# Patient Record
Sex: FEMALE | Race: WHITE | NOT HISPANIC OR LATINO
[De-identification: names, ages, dates, MRNs, and addresses within clinical notes are randomized per-mention and may not be internally consistent; named-entity substitution may affect disease eponyms.]

---

## 2017-09-08 ENCOUNTER — APPOINTMENT (OUTPATIENT)
Dept: GASTROENTEROLOGY | Facility: CLINIC | Age: 62
End: 2017-09-08
Payer: COMMERCIAL

## 2017-09-08 VITALS
TEMPERATURE: 98.3 F | HEIGHT: 67 IN | BODY MASS INDEX: 20.88 KG/M2 | DIASTOLIC BLOOD PRESSURE: 70 MMHG | SYSTOLIC BLOOD PRESSURE: 102 MMHG | WEIGHT: 133 LBS

## 2017-09-08 DIAGNOSIS — Z80.9 FAMILY HISTORY OF MALIGNANT NEOPLASM, UNSPECIFIED: ICD-10-CM

## 2017-09-08 DIAGNOSIS — Z78.9 OTHER SPECIFIED HEALTH STATUS: ICD-10-CM

## 2017-09-08 PROCEDURE — 99213 OFFICE O/P EST LOW 20 MIN: CPT

## 2017-09-19 ENCOUNTER — APPOINTMENT (OUTPATIENT)
Dept: GASTROENTEROLOGY | Facility: AMBULATORY MEDICAL SERVICES | Age: 62
End: 2017-09-19
Payer: COMMERCIAL

## 2017-09-19 PROCEDURE — 45381 COLONOSCOPY SUBMUCOUS NJX: CPT

## 2018-06-19 ENCOUNTER — RESULT REVIEW (OUTPATIENT)
Age: 63
End: 2018-06-19

## 2020-01-28 ENCOUNTER — APPOINTMENT (OUTPATIENT)
Dept: RADIOLOGY | Facility: CLINIC | Age: 65
End: 2020-01-28
Payer: COMMERCIAL

## 2020-01-28 ENCOUNTER — OUTPATIENT (OUTPATIENT)
Dept: OUTPATIENT SERVICES | Facility: HOSPITAL | Age: 65
LOS: 1 days | End: 2020-01-28
Payer: COMMERCIAL

## 2020-01-28 DIAGNOSIS — Z00.8 ENCOUNTER FOR OTHER GENERAL EXAMINATION: ICD-10-CM

## 2020-01-28 PROCEDURE — 77080 DXA BONE DENSITY AXIAL: CPT | Mod: 26

## 2020-01-28 PROCEDURE — 77080 DXA BONE DENSITY AXIAL: CPT

## 2020-03-02 ENCOUNTER — APPOINTMENT (OUTPATIENT)
Dept: ORTHOPEDIC SURGERY | Facility: CLINIC | Age: 65
End: 2020-03-02
Payer: COMMERCIAL

## 2020-03-02 VITALS
DIASTOLIC BLOOD PRESSURE: 72 MMHG | HEART RATE: 67 BPM | SYSTOLIC BLOOD PRESSURE: 116 MMHG | WEIGHT: 135 LBS | HEIGHT: 67 IN | BODY MASS INDEX: 21.19 KG/M2 | TEMPERATURE: 97.9 F

## 2020-03-02 DIAGNOSIS — M54.16 RADICULOPATHY, LUMBAR REGION: ICD-10-CM

## 2020-03-02 DIAGNOSIS — Z87.39 PERSONAL HISTORY OF OTHER DISEASES OF THE MUSCULOSKELETAL SYSTEM AND CONNECTIVE TISSUE: ICD-10-CM

## 2020-03-02 PROCEDURE — 99203 OFFICE O/P NEW LOW 30 MIN: CPT

## 2020-03-02 PROCEDURE — 73502 X-RAY EXAM HIP UNI 2-3 VIEWS: CPT | Mod: RT

## 2020-03-05 NOTE — HISTORY OF PRESENT ILLNESS
[6] : a current pain level of 6/10 [7] : the ailment interference is 7/10 [9] : the ailment interference is 9/10 [8] : the ailment interference is 8/10 [4] : the ailment interference is 4/10 [de-identified] : The patient comes in today with complaints of pain to her right hip.  She states she was walking in the [place] at [place] and developed pain, but she points to her low back and buttock with radiating pain down her leg.  The patient states the onset/injury occurred on 02/02/20.  This injury is not work related or due to an automobile accident.  The patient states the pain is burning.  The patient describes the pain as burning. [de-identified] : Sitting down and bending over. [de-identified] : Standing up. [] : No [de-identified] : Aspirin [de-identified] : Loss of balance.

## 2020-03-05 NOTE — PHYSICAL EXAM
[de-identified] : Right Hip: Range of Motion in Degrees:\par 	                                 Claimant:	   Normal:	\par Flexion (Active) 	                 120 	   120-degrees	\par Flexion (Passive)	                 120	   120-degrees	\par Extension (Active)	                 -30	   -30-degrees	\par Extension (Passive)	 -30	   -30-degrees	\par Abduction (Active)	                 45-50	   38-31-tbhwkfk	\par Abduction (Passive)	 45-50	   10-24-qbyiowj	\par Adduction (Active)  	 20-30	   37-93-zhcsjwy	\par Adduction (Passive)	 20-30	   43-66-dyylaac	\par Internal Rotation (Active) 	 35	   35-degrees	\par Internal Rotation (Passive)	 35	   35-degrees	\par External Rotation (Active)	 45	   45-degrees	\par External Rotation (Passive)	 45	   45-degrees	\par \par No tenderness with internal or external rotation or axial load.  No tenderness to palpation over the greater trochanter.  Negative Trendelenburg.  No tenderness with resisted abduction.  No weakness to flexion, extension, abduction or adduction.  No evidence of instability.  No motor or sensory deficits.  2+ DP and PT pulses.  Skin is intact.  No scars, rashes or lesions.  \par  \par Left Hip: Range of Motion in Degrees:\par 	                                 Claimant:	   Normal:	\par Flexion (Active) 	                 120 	   120-degrees	\par Flexion (Passive)	                 120	   120-degrees	\par Extension (Active)	                 -30	   -30-degrees	\par Extension (Passive)	 -30	   -30-degrees	\par Abduction (Active)	                 45-50	   63-65-qyadxff	\par Abduction (Passive)	 45-50	   34-44-pgnwuft	\par Adduction (Active)  	 20-30	   89-50-pbmslrp	\par Adduction (Passive)	 20-30	   22-03-ecchvnc	\par Internal Rotation (Active) 	 35	   35-degrees	\par Internal Rotation (Passive)	 35	   35-degrees	\par External Rotation (Active)	 45	   45-degrees	\par External Rotation (Passive)	 45	   45-degrees	\par \par No tenderness with internal or external rotation or axial load.  No tenderness to palpation over the greater trochanter.  Negative Trendelenburg.  No tenderness with resisted abduction.  No weakness to flexion, extension, abduction or adduction.  No evidence of instability.  No motor or sensory deficits.  2+ DP and PT pulses.  Skin is intact.  No scars, rashes or lesions.  \par   [de-identified] : Gait and Station:  Ambulating with a slightly antalgic to antalgic gait.  Normal Station.  [de-identified] : Appearance:  Well developed, well-nourished female in no acute distress.\par   [de-identified] : Radiographs, two to three views of the right hip and pelvis, show no obvious osseous abnormalities.\par

## 2020-03-05 NOTE — ADDENDUM
[FreeTextEntry1] : This note was written by Junito Reese on 03/03/2020, acting as a scribe for Chip Montes De Oca III, MD

## 2020-03-05 NOTE — DISCUSSION/SUMMARY
[de-identified] : At this time, due to probable lumbar radiculopathy, she is being referred for a spine evaluation.  \par

## 2020-03-05 NOTE — CONSULT LETTER
[Dear  ___] : Dear  [unfilled], [Consult Letter:] : I had the pleasure of evaluating your patient, [unfilled]. [Consult Closing:] : Thank you very much for allowing me to participate in the care of this patient.  If you have any questions, please do not hesitate to contact me. [Please see my note below.] : Please see my note below. [Sincerely,] : Sincerely, [DrDino  ___] : Dr. PECK [FreeTextEntry3] : Chip Montes De Oca III, MD \par KEE/ravi\par

## 2020-03-26 ENCOUNTER — APPOINTMENT (OUTPATIENT)
Dept: NEUROSURGERY | Facility: CLINIC | Age: 65
End: 2020-03-26

## 2020-04-25 ENCOUNTER — MESSAGE (OUTPATIENT)
Age: 65
End: 2020-04-25

## 2020-05-05 LAB
SARS-COV-2 IGG SERPL IA-ACNC: <0.1 INDEX
SARS-COV-2 IGG SERPL QL IA: NEGATIVE

## 2020-12-13 ENCOUNTER — EMERGENCY (EMERGENCY)
Facility: HOSPITAL | Age: 65
LOS: 0 days | Discharge: HOME | End: 2020-12-13
Attending: EMERGENCY MEDICINE
Payer: COMMERCIAL

## 2020-12-13 VITALS
TEMPERATURE: 98 F | OXYGEN SATURATION: 100 % | HEART RATE: 87 BPM | DIASTOLIC BLOOD PRESSURE: 82 MMHG | SYSTOLIC BLOOD PRESSURE: 117 MMHG | RESPIRATION RATE: 17 BRPM

## 2020-12-13 VITALS — HEIGHT: 67 IN | WEIGHT: 145.06 LBS
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
## 2020-12-13 DIAGNOSIS — S00.83XA CONTUSION OF OTHER PART OF HEAD, INITIAL ENCOUNTER: ICD-10-CM

## 2020-12-13 DIAGNOSIS — S80.212A ABRASION, LEFT KNEE, INITIAL ENCOUNTER: ICD-10-CM
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
## 2020-12-13 DIAGNOSIS — S63.501A UNSPECIFIED SPRAIN OF RIGHT WRIST, INITIAL ENCOUNTER: ICD-10-CM

## 2020-12-13 DIAGNOSIS — S09.90XA UNSPECIFIED INJURY OF HEAD, INITIAL ENCOUNTER: ICD-10-CM
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
## 2020-12-13 PROCEDURE — 72125 CT NECK SPINE W/O DYE: CPT | Mod: 26

## 2020-12-13 PROCEDURE — 73110 X-RAY EXAM OF WRIST: CPT | Mod: RT

## 2020-12-13 PROCEDURE — 70450 CT HEAD/BRAIN W/O DYE: CPT | Mod: 26

## 2020-12-13 PROCEDURE — 76376 3D RENDER W/INTRP POSTPROCES: CPT

## 2020-12-13 PROCEDURE — 70486 CT MAXILLOFACIAL W/O DYE: CPT

## 2020-12-13 PROCEDURE — 70486 CT MAXILLOFACIAL W/O DYE: CPT | Mod: 26

## 2020-12-13 PROCEDURE — 73562 X-RAY EXAM OF KNEE 3: CPT | Mod: LT

## 2020-12-13 PROCEDURE — 73110 X-RAY EXAM OF WRIST: CPT | Mod: 26,RT

## 2020-12-13 PROCEDURE — 72125 CT NECK SPINE W/O DYE: CPT

## 2020-12-13 PROCEDURE — 99284 EMERGENCY DEPT VISIT MOD MDM: CPT | Mod: 25

## 2020-12-13 PROCEDURE — 99285 EMERGENCY DEPT VISIT HI MDM: CPT

## 2020-12-13 PROCEDURE — 73562 X-RAY EXAM OF KNEE 3: CPT | Mod: 26,LT

## 2020-12-13 PROCEDURE — 76376 3D RENDER W/INTRP POSTPROCES: CPT | Mod: 26

## 2020-12-13 PROCEDURE — 70450 CT HEAD/BRAIN W/O DYE: CPT

## 2020-12-13 NOTE — ED PROVIDER NOTE - MUSCULOSKELETAL, MLM
Neck: no M/L tenderness, + mild R trapezius m. tender extending down to R upper back, + AFROM.  Back: no spine tenderness, + R trapezius m. tender.  CABRERA x 4.  R wrist + ecchymotic tswelling/tender ulnar aspect, minimal tender aat. snuffbox, good AROM with discomfort, radial pulse normal.  R hand: no swelling, tenderness, digits move well, normal motor/sensation, CR normal.  L knee: medial aspect + slight soft tissue swelling with overlying superf. abrasion, min. TTP, + AFROM, jt. stable.  LLE distal + NVI.

## 2020-12-13 NOTE — ED PROVIDER NOTE - OBJECTIVE STATEMENT
66 yo WF, denies PMH, ambulatory to ED for evaluation s/p trip/fall yesterday with associated injury to head, R face, R wrist & L knee.  While walking her dog, pt tripped & fell forwards upon the asphalt with her L knee, outstretched RUE & head/forehead.  No ASA/AC med.  Pt denies LOC & reports + self-ambulatory afterwards w/o abnl. gait.  Pt reports mild HA, + bruised swelling R infraorbital area, aching pain R wrist & mild soreness L knee w/o difficulty walking.  No distal extremity weak/numb/paresths.  No pain to neck/back/other extremities, B/B changes, loss appetite.  No F/C, SOB, cough, COVID symptoms nor recent known ill exposure.

## 2020-12-13 NOTE — ED ADULT NURSE NOTE - NSIMPLEMENTINTERV_GEN_ALL_ED
Implemented All Fall Risk Interventions:  [place] to call system. Call bell, personal items and telephone within reach. Instruct patient to call for assistance. Room bathroom lighting operational. Non-slip footwear when patient is off stretcher. Physically safe environment: no spills, clutter or unnecessary equipment. Stretcher in lowest position, wheels locked, appropriate side rails in place. Provide visual cue, wrist band, yellow gown, etc. Monitor gait and stability. Monitor for mental status changes and reorient to person, place, and time. Review medications for side effects contributing to fall risk. Reinforce activity limits and safety measures with patient and family.

## 2020-12-13 NOTE — ED PROVIDER NOTE - ENMT, MLM
NC/AT.  Airway patent, Nasal mucosa clear. Mouth with normal mucosa. Throat has no vesicles, no oropharyngeal exudates and uvula is midline.  R upper lip + swollen with superficial abrasion mucosal surface.  Teeth NT & stable.

## 2020-12-13 NOTE — ED ADULT TRIAGE NOTE - CHIEF COMPLAINT QUOTE
c/o trip and fall while walking dog yesterday, hit asphalt, right periorbital swelling and redness, c/o right wrist pain and swelling, does not take anticoagulant daily, denies LOC

## 2020-12-13 NOTE — ED PROVIDER NOTE - CONSTITUTIONAL, MLM
normal... Elderly thin WF, awake, alert, oriented to person, place, time/situation and in no apparent distress.

## 2020-12-13 NOTE — ED PROVIDER NOTE - CHPI ED SYMPTOMS NEG
no bleeding/no confusion/no deformity/no fever/no loss of consciousness/no numbness/no tingling/no vomiting/no weakness

## 2020-12-13 NOTE — ED PROVIDER NOTE - SECONDARY DIAGNOSIS.
Facial contusion, initial encounter Contusion of left knee, initial encounter Sprain of wrist joint, left, initial encounter Abrasion of knee, initial encounter Sprain of wrist joint, right, initial encounter

## 2020-12-13 NOTE — ED PROVIDER NOTE - CARE PLAN
Principal Discharge DX:	Closed head injury without loss of consciousness, initial encounter  Secondary Diagnosis:	Facial contusion, initial encounter  Secondary Diagnosis:	Contusion of left knee, initial encounter  Secondary Diagnosis:	Sprain of wrist joint, left, initial encounter  Secondary Diagnosis:	Abrasion of knee, initial encounter   Principal Discharge DX:	Closed head injury without loss of consciousness, initial encounter  Secondary Diagnosis:	Facial contusion, initial encounter  Secondary Diagnosis:	Contusion of left knee, initial encounter  Secondary Diagnosis:	Abrasion of knee, initial encounter  Secondary Diagnosis:	Sprain of wrist joint, right, initial encounter

## 2020-12-13 NOTE — ED PROVIDER NOTE - NSFOLLOWUPINSTRUCTIONS_ED_ALL_ED_FT
Tylenol or Motrin/Advil 2 tabs every 6(-8) hours with some food as needed for headache, aches & pains.  Continue regular medications as per routine.  Wear right wrist splint next few days, wear arm sling for support.  Follow up this upcoming week with own doctor & orthopedist listed below or own orthopedist.      ED evaluation and management discussed with the patient and family (if available) in detail.  Close PMD follow up encouraged.  Strict ED return instructions discussed in detail and patient given the opportunity to ask any questions about their discharge diagnosis and instructions. Patient verbalized understanding.        Closed Head Injury    A closed head injury is an injury to your head that may or may not involve a traumatic brain injury (TBI). Symptoms of TBI can be short or long lasting and include headache, dizziness, interference with memory or speech, fatigue, confusion, changes in sleep, mood changes, nausea, depression/anxiety, and dulling of senses. Make sure to obtain proper rest which includes getting plenty of sleep, avoiding excessive visual stimulation, and avoiding activities that may cause physical or mental stress. Avoid any situation where there is potential for another head injury, including sports.    SEEK IMMEDIATE MEDICAL CARE IF YOU HAVE ANY OF THE FOLLOWING SYMPTOMS: unusual drowsiness, vomiting, severe dizziness, seizures, lightheadedness, muscular weakness, different pupil sizes, visual changes, or clear or bloody discharge from your ears or nose.        Contusion    A contusion is a deep bruise. Contusions are the result of a blunt injury to tissues and muscle fibers under the skin. The skin overlying the contusion may turn blue, purple, or yellow. Symptoms also include pain and swelling in the injured area.    SEEK IMMEDIATE MEDICAL CARE IF YOU HAVE ANY OF THE FOLLOWING SYMPTOMS: severe pain, numbness, tingling, pain, weakness, or skin color/temperature change in any part of your body distal to the injury. Tylenol or Motrin/Advil 2 tabs every 6(-8) hours with some food as needed for headache, aches & pains.  Continue regular medications as per routine.  Wear right wrist splint next few days, wear arm sling for support.  Follow up this upcoming week with own doctor & orthopedist listed below or own orthopedist.      ED evaluation and management discussed with the patient and family (if available) in detail.  Close PMD follow up encouraged.  Strict ED return instructions discussed in detail and patient given the opportunity to ask any questions about their discharge diagnosis and instructions. Patient verbalized understanding.        Closed Head Injury    A closed head injury is an injury to your head that may or may not involve a traumatic brain injury (TBI). Symptoms of TBI can be short or long lasting and include headache, dizziness, interference with memory or speech, fatigue, confusion, changes in sleep, mood changes, nausea, depression/anxiety, and dulling of senses. Make sure to obtain proper rest which includes getting plenty of sleep, avoiding excessive visual stimulation, and avoiding activities that may cause physical or mental stress. Avoid any situation where there is potential for another head injury, including sports.    SEEK IMMEDIATE MEDICAL CARE IF YOU HAVE ANY OF THE FOLLOWING SYMPTOMS: unusual drowsiness, vomiting, severe dizziness, seizures, lightheadedness, muscular weakness, different pupil sizes, visual changes, or clear or bloody discharge from your ears or nose.        Contusion    A contusion is a deep bruise. Contusions are the result of a blunt injury to tissues and muscle fibers under the skin. The skin overlying the contusion may turn blue, purple, or yellow. Symptoms also include pain and swelling in the injured area.    SEEK IMMEDIATE MEDICAL CARE IF YOU HAVE ANY OF THE FOLLOWING SYMPTOMS: severe pain, numbness, tingling, pain, weakness, or skin color/temperature change in any part of your body distal to the injury.          Facial Contusion    WHAT YOU NEED TO KNOW:    A facial contusion is a bruise that appears on your face after an injury. A bruise happens when small blood vessels tear but skin does not. When blood vessels tear, blood leaks into nearby tissue, such as soft tissue or muscle. You may develop swelling and bruising around your eyes if your bruise is on your brow, forehead, or the bridge of your nose.     DISCHARGE INSTRUCTIONS:    Return to the emergency department if:   •You have a fever.      •You have watery, clear fluid draining from your nose.       •You have changes in your vision or eye appearance.      •You have changes or pain with eye movement.      •You have tingling or numbness in or near the injured area.      Contact your healthcare provider if:   •You find a new lump in the injured area.      •Your symptoms do not improve with treatment.      •You have questions or concerns about your condition or care.      Medicines:   •Acetaminophen decreases pain. It is available without a doctor's order. Ask how much to take and how often to take it. Follow directions. Acetaminophen can cause liver damage if not taken correctly.      •Take your medicine as directed. Contact your healthcare provider if you think your medicine is not helping or if you have side effects. Tell him of her if you are allergic to any medicine. Keep a list of the medicines, vitamins, and herbs you take. Include the amounts, and when and why you take them. Bring the list or the pill bottles to follow-up visits. Carry your medicine list with you in case of an emergency.      Ice: Apply ice on your bruise for 15 to 20 minutes every hour or as directed. Use an ice pack, or put crushed ice in a plastic bag. Cover it with a towel. Ice helps prevent tissue damage and decreases swelling and pain.    Elevation: Sleep with your head elevated to help decrease swelling.     Help your contusion heal: Do not massage the area or put heating pads or other warming devices on the bruise right after your injury. Heat and massage may slow the healing of the area.    Follow up with your healthcare provider as directed: You may need to return within a week to have your injury checked again. Write down any questions you have so you remember to ask them in your follow-up visits.    Prevent a facial contusion:   •Use safety belts and child restraints.       •Use safety helmets when you ride a bicycle or motorcycle.       •Use a mouth and face guard during sports.         Abrasion    WHAT YOU NEED TO KNOW:    An abrasion is a scrape on your skin. It happens when your skin rubs against a rough surface. Some examples of an abrasion include rug burn, a skinned elbow, or road rash. Abrasions can be many shapes and sizes. The wound may hurt, bleed, bruise, or swell.     DISCHARGE INSTRUCTIONS:    Return to the emergency department if:     The bleeding does not stop after 10 minutes of firm pressure.      You cannot rinse one or more foreign objects out of your wound.      You have red streaks on your skin coming from your wound.    Contact your healthcare provider if:     You have a fever or chills.       Your abrasion is red, warm, swollen, or draining pus.      You have questions or concerns about your condition or care.    Care for your abrasion:     Wash your hands and dry them with a clean towel.      Press a clean cloth against your wound to stop any bleeding.      Rinse your wound with a lot of clean water. Do not use harsh soap, alcohol, or iodine solutions.      Use a clean, wet cloth to remove any objects, such as small pieces of rocks or dirt.      Rub antibiotic ointment on your wound. This may help prevent infection and help your wound heal.      Cover the wound with a non-stick bandage. Change the bandage daily, and if gets wet or dirty.     Follow up with your healthcare provider as directed: Write down your questions so you remember to ask them during your visits.

## 2020-12-13 NOTE — ED PROVIDER NOTE - CLINICAL SUMMARY MEDICAL DECISION MAKING FREE TEXT BOX
64 yo WF, no ASA/AC meds, ambulatory s/p trip/fall yesterday ~ 8 AM with injury R periorbital area, R wrist, L knee.  NO LOC, self-ambulatory afterwards with steady gait, no N/V, loss appetite.  Normal neuro exam.  Td + UTD.  Motrin po taken PTA.  Plan:  CT head/c-spine, facial; XRs R wrsit, L knee. 66 yo WF, no ASA/AC meds, ambulatory s/p trip/fall yesterday ~ 8 AM with injury R periorbital area, R wrist, L knee.  NO LOC, self-ambulatory afterwards with steady gait, no N/V, loss appetite.  Normal neuro exam.  Td + UTD.  Motrin po taken PTA.  Plan:  CT head/c-spine, facial; XRs R wrist, L knee.

## 2020-12-13 NOTE — ED PROVIDER NOTE - EYES, MLM
Clear bilaterally, pupils equal, round and reactive to light.  EOMI.  + Ecchymotic swelling infraorbital area extending to cheek area, + tender inferior orbital ridge w/o step-off deformity noted.

## 2020-12-13 NOTE — ED PROVIDER NOTE - CARE PROVIDER_API CALL
Willaim Branch)  Orthopaedic Surgery; Surgery of the Hand  [address] [place] [address]  Phone: [phone]  Fax: [phone]  Follow Up Time:

## 2020-12-13 NOTE — ED ADULT NURSE REASSESSMENT NOTE - NS ED NURSE REASSESS COMMENT FT1
Pt given ice packs for right wrist/eye area, pt declined to use ice packs at this time and states it is cols, ice packs placed to side of stretcher at this time and warm blanket given to pt at this time, stretcher in lowest position, safety maintained, will continue to monitor.

## 2020-12-13 NOTE — ED PROVIDER NOTE - NEUROLOGICAL, MLM
Alert and oriented X 4, no focal deficits, no motor or sensory deficits.  CNs II - XII intact, normal speech.

## 2020-12-13 NOTE — ED PROVIDER NOTE - PATIENT PORTAL LINK FT
You can access the FollowMyHealth Patient Portal offered by [facility] by registering at the following website: [email or web address]. By joining [employer]’s FollowMyHealth portal, you will also be able to view your health information using other applications (apps) compatible with our system.

## 2020-12-13 NOTE — ED ADULT NURSE NOTE - OBJECTIVE STATEMENT
Pt presents to er with complaints of tripping over her dog yesterday over a tree root, states she hit her right eye, injured right wrist, chin and scraped her left knee, pt denies use of blood thinners, chest pain, sob, headache change in vision at this time, pt with ecchymosis to right eye, chin, swelling to right upper lip and right wrist with decrease of ROM at this time, pt's safety maintained, positioned for comfort,  will continue to monitor.

## 2020-12-14 NOTE — ED POST DISCHARGE NOTE - RESULT SUMMARY
Called for f/u regarding small fx, male answered the phone and began cursing at me and hung up -Jordy Childs PA-C

## 2020-12-14 NOTE — ED POST DISCHARGE NOTE - OTHER COMMUNICATION
12/27/20:  Dr. Huff:  Received phone call from pt requesting re-review of CT facial bones re: continued discomfort infraorbital.  Results of CT facial bones read back to pt.  I advised her to f/u with her own Optho MD for re-evaluation.  Pt had followed with Dr. Branch re: her wriast & that is doing much better.  Pt expressed her understanding & agreed to f/u her Tom GOODWIN.. 12/27/20:  Dr. Salguero:  Received phone call from pt requesting re-review of CT facial bones re: continued discomfort infraorbital.  Results of CT facial bones read back to pt.  I advised her to f/u with her own Optho MD for re-evaluation.  Pt had followed with Dr. Branch re: her wriast & that is doing much better.  Pt expressed her understanding & agreed to f/u her Tom GOODWIN..

## 2020-12-14 NOTE — ED POST DISCHARGE NOTE - DETAILS
I spoke to pt , pt not home right now, he suggested calling pt cell [phone]. No answer, I left message for call back. signed Vanessa Armenta PA-C Pt called back and was informed of likely fx on xray. Pt still having pain. I advised pt I would speak to Dr Branch (who she was given for follow up). I spoke to Dr Branch and he will call pt at this time to arrange appropriate follow up. signed Vanessa Armenta PA-C

## 2020-12-16 ENCOUNTER — EMERGENCY (EMERGENCY)
Facility: HOSPITAL | Age: 65
LOS: 0 days | Discharge: HOME | End: 2020-12-16
Attending: EMERGENCY MEDICINE
Payer: COMMERCIAL

## 2020-12-16 VITALS — HEIGHT: 72 IN | WEIGHT: 134.92 LBS

## 2020-12-16 VITALS
TEMPERATURE: 98 F | OXYGEN SATURATION: 100 % | SYSTOLIC BLOOD PRESSURE: 134 MMHG | HEART RATE: 69 BPM | DIASTOLIC BLOOD PRESSURE: 69 MMHG | RESPIRATION RATE: 17 BRPM
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
## 2020-12-16 DIAGNOSIS — S62.111A DISPLACED FRACTURE OF TRIQUETRUM [CUNEIFORM] BONE, RIGHT WRIST, INITIAL ENCOUNTER FOR CLOSED FRACTURE: ICD-10-CM
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
## 2020-12-16 PROBLEM — Z78.9 OTHER SPECIFIED HEALTH STATUS: Chronic | Status: ACTIVE | Noted: 2020-12-13

## 2020-12-16 PROCEDURE — 29075 APPL CST ELBW FNGR SHORT ARM: CPT | Mod: RT

## 2020-12-16 PROCEDURE — 99283 EMERGENCY DEPT VISIT LOW MDM: CPT

## 2020-12-16 PROCEDURE — 99284 EMERGENCY DEPT VISIT MOD MDM: CPT | Mod: 25

## 2020-12-16 NOTE — ED STATDOCS - CARE PROVIDER_API CALL
William Branch)  Orthopaedic Surgery; Surgery of the Hand  [address] [place] [address]  Phone: [phone]  Fax: [phone]  Follow Up Time: 7-10 Days

## 2020-12-16 NOTE — ED STATDOCS - PATIENT PORTAL LINK FT
You can access the FollowMyHealth Patient Portal offered by [facility] by registering at the following website: [email or web address]. By joining [employer]’s FollowMyHealth portal, you will also be able to view your health information using other applications (apps) compatible with our system.

## 2020-12-16 NOTE — ED STATDOCS - OBJECTIVE STATEMENT
64 y/o female with no pertinent PMHx, presents to the ED c/o R wrist pain s/p fall 3 days ago. Pt was seen in ED after trip and fall injuring R face, R wrist and L knee. Pt received a call by Dr. Branch and was told to go to the ED after possible fx shown on R wrist X ray.

## 2020-12-16 NOTE — ED ADULT TRIAGE NOTE - CHIEF COMPLAINT QUOTE
Pt states she was seen in the ED following a fall on sunday, pt states she received a call by Dr merchant to come to ED for possible fracture to right wrist.

## 2020-12-16 NOTE — ED STATDOCS - NSFOLLOWUPINSTRUCTIONS_ED_ALL_ED_FT
Fracture    A fracture is a break in one of your bones. This can occur from a variety of injuries, especially traumatic ones. Symptoms include pain, bruising, or swelling. Do not use the injured limb. If a fracture is in one of the bones below your waist, do not put weight on that limb unless instructed to do so by your healthcare provider. Crutches or a cane may have been provided. A splint or cast may have been applied by your health care provider. Make sure to keep it dry and follow up with an orthopedist as instructed.    SEEK IMMEDIATE MEDICAL CARE IF YOU HAVE ANY OF THE FOLLOWING SYMPTOMS: numbness, tingling, increasing pain, or weakness in any part of the injured limb.     Splint Care    WHAT YOU NEED TO KNOW:    Splint care is important to help protect your splint until it comes off. Some splints are made of fiberglass or plaster that will need to dry and harden. Splint care will help the splint dry and harden correctly. Even after your splint hardens, it can be damaged.    DISCHARGE INSTRUCTIONS:    Return to the emergency department if:     You have increased pain.      Your fingers or toes are numb or tingling.      You feel burning or stinging around your injury.      Your nails, fingers, or toes turn pale, blue, or gray, and feel cold.      You have new or increased trouble moving your fingers or toes.      Your swelling gets worse.      The skin under your splint is bleeding or leaking pus.     Contact your healthcare provider if:     Your hard splint gets wet or is damaged.      You have a fever.      Your splint feels tighter.      You have itchy, dry skin under your splint that is getting worse.      The skin under your splint is red, or you have a new sore.      You notice a bad smell coming from your splint.       You have questions or concerns about your condition or care.    How to care for your splint:     Wait for your hard splint to harden completely. You may have to wait up to 3 days before you can walk on a plaster splint.      Check your splint and the skin around it each day. Check your splint for damage, such as cracks and breaks. Check your skin for redness, increased swelling, and sores. Loosen the elastic bandage around your splint if it feels too tight.      Keep your splint clean and dry. Keep dirt out of your splint. Before you bathe, wrap your hard splint with 2 layers of plastic. Then put a plastic bag over it. Keep the plastic bag tightly sealed. You can also ask your healthcare provider about waterproof shields. Do not put your hard splint in the water, even with a plastic bag over it. A wet splint can make your skin itchy, and may lead to infection.      Do not put powders or deodorants inside your splint. These can dry your skin and increase itching.       Do not try to scratch the skin inside your hard splint with sharp objects. Sharp objects can break off inside your splint or hurt your skin.       Do not pull the padding out of your splint. The padding inside your splint protects your skin. You may develop a sore on your skin if you take out the padding.    Follow up with your healthcare provider as directed within 1 to 2 weeks: Write down your questions so you remember to ask them during your visits.

## 2020-12-16 NOTE — ED STATDOCS - PROGRESS NOTE DETAILS
64 y/o F presents with wrist pain x 3 days. Pt reports mechanical fall 3 days ago ws seen in the ED. Received call by Dr. Branch for possible fx on xr. -Sam Saldivar PA-C Pt evaluated and splinted by Dr. merchant, siva CHANEY in the office. -Sam Saldivar PA-C

## 2020-12-16 NOTE — ED STATDOCS - CLINICAL SUMMARY MEDICAL DECISION MAKING FREE TEXT BOX
hand consult Evaluated by hand specialist and immobilized.  D/c home with follow up, supportive care.

## 2021-05-27 ENCOUNTER — NON-APPOINTMENT (OUTPATIENT)
Age: 66
End: 2021-05-27

## 2021-05-27 DIAGNOSIS — Z80.8 FAMILY HISTORY OF MALIGNANT NEOPLASM OF OTHER ORGANS OR SYSTEMS: ICD-10-CM

## 2021-05-27 DIAGNOSIS — G43.009 MIGRAINE W/OUT AURA, NOT INTRACTABLE, W/OUT STATUS MIGRAINOSUS: ICD-10-CM

## 2021-05-27 DIAGNOSIS — E55.9 VITAMIN D DEFICIENCY, UNSPECIFIED: ICD-10-CM

## 2021-05-27 DIAGNOSIS — M79.604 PAIN IN RIGHT LEG: ICD-10-CM

## 2021-05-27 DIAGNOSIS — Z82.49 FAMILY HISTORY OF ISCHEMIC HEART DISEASE AND OTHER DISEASES OF THE CIRCULATORY SYSTEM: ICD-10-CM

## 2021-05-27 DIAGNOSIS — Z80.0 FAMILY HISTORY OF MALIGNANT NEOPLASM OF DIGESTIVE ORGANS: ICD-10-CM

## 2021-05-31 ENCOUNTER — INPATIENT (INPATIENT)
Facility: HOSPITAL | Age: 66
LOS: 0 days | Discharge: HOME | DRG: 156 | End: 2021-06-01
Attending: THORACIC SURGERY (CARDIOTHORACIC VASCULAR SURGERY) | Admitting: THORACIC SURGERY (CARDIOTHORACIC VASCULAR SURGERY)
Payer: COMMERCIAL

## 2021-05-31 ENCOUNTER — INPATIENT (INPATIENT)
Facility: HOSPITAL | Age: 66
LOS: 0 days | Discharge: ANOTHER HOSPITAL | DRG: 312 | End: 2021-05-31
Attending: FAMILY MEDICINE | Admitting: FAMILY MEDICINE
Payer: COMMERCIAL

## 2021-05-31 VITALS
HEIGHT: 72 IN | RESPIRATION RATE: 17 BRPM | TEMPERATURE: 98 F | OXYGEN SATURATION: 98 % | DIASTOLIC BLOOD PRESSURE: 80 MMHG | HEART RATE: 97 BPM | SYSTOLIC BLOOD PRESSURE: 146 MMHG

## 2021-05-31 VITALS
SYSTOLIC BLOOD PRESSURE: 95 MMHG | DIASTOLIC BLOOD PRESSURE: 55 MMHG | OXYGEN SATURATION: 100 % | RESPIRATION RATE: 20 BRPM | HEART RATE: 84 BPM | TEMPERATURE: 98 F

## 2021-05-31 VITALS
HEART RATE: 92 BPM | SYSTOLIC BLOOD PRESSURE: 133 MMHG | DIASTOLIC BLOOD PRESSURE: 55 MMHG | RESPIRATION RATE: 15 BRPM | OXYGEN SATURATION: 100 %

## 2021-05-31 DIAGNOSIS — S02.2XXB FRACTURE OF NASAL BONES, INITIAL ENCOUNTER FOR OPEN FRACTURE: ICD-10-CM

## 2021-05-31 DIAGNOSIS — S01.111A LACERATION WITHOUT FOREIGN BODY OF RIGHT EYELID AND PERIOCULAR AREA, INITIAL ENCOUNTER: ICD-10-CM

## 2021-05-31 DIAGNOSIS — S01.511A LACERATION WITHOUT FOREIGN BODY OF LIP, INITIAL ENCOUNTER: ICD-10-CM

## 2021-05-31 DIAGNOSIS — I49.3 VENTRICULAR PREMATURE DEPOLARIZATION: ICD-10-CM

## 2021-05-31 DIAGNOSIS — R55 SYNCOPE AND COLLAPSE: ICD-10-CM

## 2021-05-31 DIAGNOSIS — S09.12XA LACERATION OF MUSCLE AND TENDON OF HEAD, INITIAL ENCOUNTER: ICD-10-CM

## 2021-05-31 DIAGNOSIS — S09.90XA UNSPECIFIED INJURY OF HEAD, INITIAL ENCOUNTER: ICD-10-CM

## 2021-05-31 LAB
ALBUMIN SERPL ELPH-MCNC: 3.8 G/DL — SIGNIFICANT CHANGE UP (ref 3.3–5)
ALBUMIN SERPL ELPH-MCNC: 3.9 G/DL — SIGNIFICANT CHANGE UP (ref 3.3–5)
ALP SERPL-CCNC: 62 U/L — SIGNIFICANT CHANGE UP (ref 40–120)
ALP SERPL-CCNC: 70 U/L — SIGNIFICANT CHANGE UP (ref 40–120)
ALT FLD-CCNC: 16 U/L — SIGNIFICANT CHANGE UP (ref 10–45)
ALT FLD-CCNC: 26 U/L — SIGNIFICANT CHANGE UP (ref 12–78)
ANION GAP SERPL CALC-SCNC: 12 MMOL/L — SIGNIFICANT CHANGE UP (ref 5–17)
ANION GAP SERPL CALC-SCNC: 8 MMOL/L — SIGNIFICANT CHANGE UP (ref 5–17)
AST SERPL-CCNC: 20 U/L — SIGNIFICANT CHANGE UP (ref 10–40)
AST SERPL-CCNC: 21 U/L — SIGNIFICANT CHANGE UP (ref 15–37)
BASOPHILS # BLD AUTO: 0.01 K/UL — SIGNIFICANT CHANGE UP (ref 0–0.2)
BASOPHILS # BLD AUTO: 0.07 K/UL — SIGNIFICANT CHANGE UP (ref 0–0.2)
BASOPHILS NFR BLD AUTO: 0.1 % — SIGNIFICANT CHANGE UP (ref 0–2)
BASOPHILS NFR BLD AUTO: 0.4 % — SIGNIFICANT CHANGE UP (ref 0–2)
BILIRUB SERPL-MCNC: 0.3 MG/DL — SIGNIFICANT CHANGE UP (ref 0.2–1.2)
BILIRUB SERPL-MCNC: 0.3 MG/DL — SIGNIFICANT CHANGE UP (ref 0.2–1.2)
BUN SERPL-MCNC: 6 MG/DL — LOW (ref 7–23)
BUN SERPL-MCNC: 7 MG/DL — SIGNIFICANT CHANGE UP (ref 7–23)
CALCIUM SERPL-MCNC: 8.6 MG/DL — SIGNIFICANT CHANGE UP (ref 8.4–10.5)
CALCIUM SERPL-MCNC: 9.1 MG/DL — SIGNIFICANT CHANGE UP (ref 8.5–10.1)
CHLORIDE SERPL-SCNC: 102 MMOL/L — SIGNIFICANT CHANGE UP (ref 96–108)
CHLORIDE SERPL-SCNC: 103 MMOL/L — SIGNIFICANT CHANGE UP (ref 96–108)
CK SERPL-CCNC: 152 U/L — SIGNIFICANT CHANGE UP (ref 26–192)
CO2 SERPL-SCNC: 18 MMOL/L — LOW (ref 22–31)
CO2 SERPL-SCNC: 22 MMOL/L — SIGNIFICANT CHANGE UP (ref 22–31)
CREAT SERPL-MCNC: 0.54 MG/DL — SIGNIFICANT CHANGE UP (ref 0.5–1.3)
CREAT SERPL-MCNC: 0.67 MG/DL — SIGNIFICANT CHANGE UP (ref 0.5–1.3)
EOSINOPHIL # BLD AUTO: 0 K/UL — SIGNIFICANT CHANGE UP (ref 0–0.5)
EOSINOPHIL # BLD AUTO: 0.06 K/UL — SIGNIFICANT CHANGE UP (ref 0–0.5)
EOSINOPHIL NFR BLD AUTO: 0 % — SIGNIFICANT CHANGE UP (ref 0–6)
EOSINOPHIL NFR BLD AUTO: 0.4 % — SIGNIFICANT CHANGE UP (ref 0–6)
GLUCOSE SERPL-MCNC: 117 MG/DL — HIGH (ref 70–99)
GLUCOSE SERPL-MCNC: 138 MG/DL — HIGH (ref 70–99)
HCT VFR BLD CALC: 31.4 % — LOW (ref 34.5–45)
HCT VFR BLD CALC: 36.2 % — SIGNIFICANT CHANGE UP (ref 34.5–45)
HGB BLD-MCNC: 10.5 G/DL — LOW (ref 11.5–15.5)
HGB BLD-MCNC: 12.1 G/DL — SIGNIFICANT CHANGE UP (ref 11.5–15.5)
IMM GRANULOCYTES NFR BLD AUTO: 0.2 % — SIGNIFICANT CHANGE UP (ref 0–1.5)
IMM GRANULOCYTES NFR BLD AUTO: 0.4 % — SIGNIFICANT CHANGE UP (ref 0–1.5)
LYMPHOCYTES # BLD AUTO: 0.65 K/UL — LOW (ref 1–3.3)
LYMPHOCYTES # BLD AUTO: 1.03 K/UL — SIGNIFICANT CHANGE UP (ref 1–3.3)
LYMPHOCYTES # BLD AUTO: 6.3 % — LOW (ref 13–44)
LYMPHOCYTES # BLD AUTO: 6.6 % — LOW (ref 13–44)
MAGNESIUM SERPL-MCNC: 1.9 MG/DL — SIGNIFICANT CHANGE UP (ref 1.6–2.6)
MCHC RBC-ENTMCNC: 30.1 PG — SIGNIFICANT CHANGE UP (ref 27–34)
MCHC RBC-ENTMCNC: 30.4 PG — SIGNIFICANT CHANGE UP (ref 27–34)
MCHC RBC-ENTMCNC: 33.4 GM/DL — SIGNIFICANT CHANGE UP (ref 32–36)
MCHC RBC-ENTMCNC: 33.4 GM/DL — SIGNIFICANT CHANGE UP (ref 32–36)
MCV RBC AUTO: 90 FL — SIGNIFICANT CHANGE UP (ref 80–100)
MCV RBC AUTO: 91 FL — SIGNIFICANT CHANGE UP (ref 80–100)
MONOCYTES # BLD AUTO: 0.53 K/UL — SIGNIFICANT CHANGE UP (ref 0–0.9)
MONOCYTES # BLD AUTO: 0.87 K/UL — SIGNIFICANT CHANGE UP (ref 0–0.9)
MONOCYTES NFR BLD AUTO: 5.3 % — SIGNIFICANT CHANGE UP (ref 2–14)
MONOCYTES NFR BLD AUTO: 5.4 % — SIGNIFICANT CHANGE UP (ref 2–14)
NEUTROPHILS # BLD AUTO: 14.19 K/UL — HIGH (ref 1.8–7.4)
NEUTROPHILS # BLD AUTO: 8.69 K/UL — HIGH (ref 1.8–7.4)
NEUTROPHILS NFR BLD AUTO: 87.2 % — HIGH (ref 43–77)
NEUTROPHILS NFR BLD AUTO: 87.7 % — HIGH (ref 43–77)
NRBC # BLD: 0 /100 WBCS — SIGNIFICANT CHANGE UP (ref 0–0)
PHOSPHATE SERPL-MCNC: 3.4 MG/DL — SIGNIFICANT CHANGE UP (ref 2.5–4.5)
PLATELET # BLD AUTO: 221 K/UL — SIGNIFICANT CHANGE UP (ref 150–400)
PLATELET # BLD AUTO: 270 K/UL — SIGNIFICANT CHANGE UP (ref 150–400)
POTASSIUM SERPL-MCNC: 3.6 MMOL/L — SIGNIFICANT CHANGE UP (ref 3.5–5.3)
POTASSIUM SERPL-MCNC: 4.1 MMOL/L — SIGNIFICANT CHANGE UP (ref 3.5–5.3)
POTASSIUM SERPL-SCNC: 3.6 MMOL/L — SIGNIFICANT CHANGE UP (ref 3.5–5.3)
POTASSIUM SERPL-SCNC: 4.1 MMOL/L — SIGNIFICANT CHANGE UP (ref 3.5–5.3)
PROT SERPL-MCNC: 6.6 G/DL — SIGNIFICANT CHANGE UP (ref 6–8.3)
PROT SERPL-MCNC: 7.6 GM/DL — SIGNIFICANT CHANGE UP (ref 6–8.3)
RAPID RVP RESULT: SIGNIFICANT CHANGE UP
RBC # BLD: 3.49 M/UL — LOW (ref 3.8–5.2)
RBC # BLD: 3.98 M/UL — SIGNIFICANT CHANGE UP (ref 3.8–5.2)
RBC # FLD: 12.6 % — SIGNIFICANT CHANGE UP (ref 10.3–14.5)
RBC # FLD: 12.7 % — SIGNIFICANT CHANGE UP (ref 10.3–14.5)
SARS-COV-2 RNA SPEC QL NAA+PROBE: SIGNIFICANT CHANGE UP
SODIUM SERPL-SCNC: 132 MMOL/L — LOW (ref 135–145)
SODIUM SERPL-SCNC: 133 MMOL/L — LOW (ref 135–145)
TROPONIN I SERPL-MCNC: <0.015 NG/ML — SIGNIFICANT CHANGE UP (ref 0.01–0.04)
TROPONIN I SERPL-MCNC: <0.015 NG/ML — SIGNIFICANT CHANGE UP (ref 0.01–0.04)
WBC # BLD: 16.28 K/UL — HIGH (ref 3.8–10.5)
WBC # BLD: 9.9 K/UL — SIGNIFICANT CHANGE UP (ref 3.8–10.5)
WBC # FLD AUTO: 16.28 K/UL — HIGH (ref 3.8–10.5)
WBC # FLD AUTO: 9.9 K/UL — SIGNIFICANT CHANGE UP (ref 3.8–10.5)

## 2021-05-31 PROCEDURE — 70450 CT HEAD/BRAIN W/O DYE: CPT | Mod: 26,MA

## 2021-05-31 PROCEDURE — 99223 1ST HOSP IP/OBS HIGH 75: CPT

## 2021-05-31 PROCEDURE — 72125 CT NECK SPINE W/O DYE: CPT | Mod: 26,MA

## 2021-05-31 PROCEDURE — 86618 LYME DISEASE ANTIBODY: CPT

## 2021-05-31 PROCEDURE — 99285 EMERGENCY DEPT VISIT HI MDM: CPT

## 2021-05-31 PROCEDURE — 72170 X-RAY EXAM OF PELVIS: CPT | Mod: 26

## 2021-05-31 PROCEDURE — 73110 X-RAY EXAM OF WRIST: CPT | Mod: 26,RT

## 2021-05-31 PROCEDURE — 93010 ELECTROCARDIOGRAM REPORT: CPT

## 2021-05-31 PROCEDURE — 86666 EHRLICHIA ANTIBODY: CPT

## 2021-05-31 PROCEDURE — 73030 X-RAY EXAM OF SHOULDER: CPT | Mod: 26,RT

## 2021-05-31 PROCEDURE — 36415 COLL VENOUS BLD VENIPUNCTURE: CPT

## 2021-05-31 PROCEDURE — 84484 ASSAY OF TROPONIN QUANT: CPT

## 2021-05-31 PROCEDURE — 86753 PROTOZOA ANTIBODY NOS: CPT

## 2021-05-31 PROCEDURE — 70486 CT MAXILLOFACIAL W/O DYE: CPT | Mod: 26,MA

## 2021-05-31 PROCEDURE — 99221 1ST HOSP IP/OBS SF/LOW 40: CPT

## 2021-05-31 PROCEDURE — 71045 X-RAY EXAM CHEST 1 VIEW: CPT | Mod: 26

## 2021-05-31 PROCEDURE — 76376 3D RENDER W/INTRP POSTPROCES: CPT | Mod: 26

## 2021-05-31 PROCEDURE — 71045 X-RAY EXAM CHEST 1 VIEW: CPT | Mod: 26,77

## 2021-05-31 RX ORDER — ERYTHROMYCIN BASE 5 MG/GRAM
1 OINTMENT (GRAM) OPHTHALMIC (EYE) ONCE
Refills: 0 | Status: DISCONTINUED | OUTPATIENT
Start: 2021-05-31 | End: 2021-05-31

## 2021-05-31 RX ORDER — SODIUM CHLORIDE 9 MG/ML
1000 INJECTION INTRAMUSCULAR; INTRAVENOUS; SUBCUTANEOUS ONCE
Refills: 0 | Status: COMPLETED | OUTPATIENT
Start: 2021-05-31 | End: 2021-05-31

## 2021-05-31 RX ORDER — CEFAZOLIN SODIUM 1 G
2000 VIAL (EA) INJECTION ONCE
Refills: 0 | Status: COMPLETED | OUTPATIENT
Start: 2021-05-31 | End: 2021-05-31

## 2021-05-31 RX ORDER — SODIUM CHLORIDE 9 MG/ML
3 INJECTION INTRAMUSCULAR; INTRAVENOUS; SUBCUTANEOUS EVERY 8 HOURS
Refills: 0 | Status: DISCONTINUED | OUTPATIENT
Start: 2021-05-31 | End: 2021-06-01

## 2021-05-31 RX ORDER — ACETAMINOPHEN 500 MG
1000 TABLET ORAL ONCE
Refills: 0 | Status: COMPLETED | OUTPATIENT
Start: 2021-05-31 | End: 2021-05-31

## 2021-05-31 RX ORDER — ERYTHROMYCIN BASE 5 MG/GRAM
1 OINTMENT (GRAM) OPHTHALMIC (EYE)
Refills: 0 | Status: DISCONTINUED | OUTPATIENT
Start: 2021-05-31 | End: 2021-06-01

## 2021-05-31 RX ORDER — METOCLOPRAMIDE HCL 10 MG
10 TABLET ORAL ONCE
Refills: 0 | Status: COMPLETED | OUTPATIENT
Start: 2021-05-31 | End: 2021-05-31

## 2021-05-31 RX ORDER — ONDANSETRON 8 MG/1
4 TABLET, FILM COATED ORAL EVERY 6 HOURS
Refills: 0 | Status: DISCONTINUED | OUTPATIENT
Start: 2021-05-31 | End: 2021-05-31

## 2021-05-31 RX ORDER — ACETAMINOPHEN 500 MG
650 TABLET ORAL EVERY 6 HOURS
Refills: 0 | Status: DISCONTINUED | OUTPATIENT
Start: 2021-05-31 | End: 2021-05-31

## 2021-05-31 RX ORDER — KETOROLAC TROMETHAMINE 30 MG/ML
15 SYRINGE (ML) INJECTION ONCE
Refills: 0 | Status: DISCONTINUED | OUTPATIENT
Start: 2021-05-31 | End: 2021-05-31

## 2021-05-31 RX ORDER — BACITRACIN ZINC 500 UNIT/G
1 OINTMENT IN PACKET (EA) TOPICAL THREE TIMES A DAY
Refills: 0 | Status: DISCONTINUED | OUTPATIENT
Start: 2021-05-31 | End: 2021-06-01

## 2021-05-31 RX ORDER — ACETAMINOPHEN 500 MG
650 TABLET ORAL EVERY 6 HOURS
Refills: 0 | Status: DISCONTINUED | OUTPATIENT
Start: 2021-05-31 | End: 2021-06-01

## 2021-05-31 RX ADMIN — SODIUM CHLORIDE 3 MILLILITER(S): 9 INJECTION INTRAMUSCULAR; INTRAVENOUS; SUBCUTANEOUS at 21:11

## 2021-05-31 RX ADMIN — Medication 1000 MILLIGRAM(S): at 08:41

## 2021-05-31 RX ADMIN — Medication 100 MILLIGRAM(S): at 08:41

## 2021-05-31 RX ADMIN — Medication 1000 MILLIGRAM(S): at 11:48

## 2021-05-31 RX ADMIN — SODIUM CHLORIDE 1000 MILLILITER(S): 9 INJECTION INTRAMUSCULAR; INTRAVENOUS; SUBCUTANEOUS at 09:49

## 2021-05-31 RX ADMIN — Medication 10 MILLIGRAM(S): at 08:42

## 2021-05-31 RX ADMIN — Medication 1 TABLET(S): at 17:19

## 2021-05-31 RX ADMIN — SODIUM CHLORIDE 1000 MILLILITER(S): 9 INJECTION INTRAMUSCULAR; INTRAVENOUS; SUBCUTANEOUS at 08:42

## 2021-05-31 RX ADMIN — Medication 2000 MILLIGRAM(S): at 09:15

## 2021-05-31 RX ADMIN — Medication 15 MILLIGRAM(S): at 17:53

## 2021-05-31 RX ADMIN — Medication 200 MILLIGRAM(S): at 10:44

## 2021-05-31 NOTE — CONSULT NOTE ADULT - ATTENDING COMMENTS
ATTENDING ATTESTATION  I have seen and examined this patient with Dr. Ambrosio. I have reviewed all labs, imaging and reports. I have participated in formulating the plan, and have read and agree with the history, ROS, exam, assessment and plan as stated by Dr. Ambrosio above.     S/p syncopal event after getting out of the shower with facial/head trauma.   CT Head/cspine show only a scalp hematoma with a displaced fracture of the right maxillary nasal process.   I recommend   - facial plastics/OMFS consult for fracture.   - CXR and PXR  - xrays of the right shoulder and wrist for overlying tenderness.     Trauma will follow up for tertiary survey.     Total time spent in the care of this patient today (excluding critical care & procedures): 55 min                 Over 50% of the total time was spent on counseling and coordination of care.     Marcy La M.D., M.S.  Division of Acute Care Surgery

## 2021-05-31 NOTE — ED PROVIDER NOTE - SHIFT CHANGE DETAILS
Attending note (Ladarius): I have received sign out on this patient, briefly: 66yo female with no pmh presenting as transfer from  after syncope and resultant facial injuries ecluding nasal / orbital fractures and periorbital lacerations; awaiting surgery/optho consultations and then admission for w/u of syncope.

## 2021-05-31 NOTE — ED PROVIDER NOTE - PHYSICAL EXAMINATION
General appearance: NAD, conversant, afebrile    Eyes: anicteric sclerae, ROMINA, EOMI, no pain with EOM   HEENT: 3 facial laceration repaired, ecchymosis over R eye   Neck: Trachea midline; Full range of motion, supple   Pulm: CTA bl, normal respiratory effort and no intercostal retractions, normal work of breathing   CV: RRR, No murmurs, rubs, or gallop   Abdomen: Soft, non-tender, non-distended; no guarding or rebound   Extremities: No peripheral edema   Skin: Dry, normal temperature, turgor and texture; no rash   Psych: Appropriate affect, cooperative; alert and oriented to person, place and time

## 2021-05-31 NOTE — ED PROVIDER NOTE - OBJECTIVE STATEMENT
66yo female with no pmh presenting as transfer from .  Earlier this morning she got out of shower and lost consciousness.  She does not recall anything that had happened and next she knew she was being assisted by  downstairs.  No chest pain, sob.  States she has had a history of loc for which she has been following with cards.  At  she was found to have an open nasal fracture with lacerations repaired by plastics and transferred for ophthalmology eval for concern for lacrimal duct injury.

## 2021-05-31 NOTE — ED PROVIDER NOTE - ATTENDING CONTRIBUTION TO CARE
MD Chávez:  patient seen and evaluated with the resident.  I was present for key portions of the History & Physical, and I agree with the Impression & Plan.  MD Chávez:  66 yo F, xfer from [facility] s/p fall with complex facial laceration.   Context:  fall at home, but patient has very poor recollection of the event.  Thinks she fell downstairs, because that is where all the blood is concentrated in her home, and then walked upstairs to her bedroom.  while in [place] ED she was worked up aggressively with CT imaging, that showed complex nasal Fx.  More importantly she has a complex R orbital/nasal laceration that may involved the lacrimal duct, for which she was sent to [facility] for evaluation by occuloplastics.    Associated Sx: no CP/SOB or palpitations prior the syncopal event.  ++facial swelling and pain, damion R orbit, but no double vision since the fall.    Better/worse: palpation of face  VS: wnl.  Physical Exam: elderly F, NAD, Skin:  +large R orbital/nasal facial laceration PERRL, EOMI, neck supple, RRR, CTA B, Abd: s/nd/nt.  Neuro: AAOx1 (baseline = AAOx1), gait not assessed, strength 5/5 & symmetric throughout.  Impression:  Complex facial laceration and possible lacrimal duct laceration due to trauma sustained after a syncopal episode.  Plan:  Occuplastic consul for lacrimal duct evaluation.  likely tele admit for syncope evaluation.

## 2021-05-31 NOTE — H&P ADULT - NSHPPHYSICALEXAM_GEN_ALL_CORE
GCS of 15  Airway is patent  Breathing is symmetric and unlabored  Neuro: CNII-XII grossly intact  Psych: normal affect  HEENT: Normocephalic, multiple facial lacerations s/p repair by plastic surgery, right proximal lateral nasal open fracture/laceration closed/repaired by plastic surgery, SASKIA, EOM wnl, no otorrhea b/l, no epistaxis or d/c b/l nares, no gross craniofacial bony pathology or tenderness b/l otherwise  Neck: Soft and supple, nontender to exam. No crepitus, no ecchymosis, no hematoma, to exam, no JVD, no tracheal deviation  Cspine/thoracolumbrosacral spine: no gross bony pathology or tenderness to exam  Cardiovascular: S1S2 Present  Chest: no gross rib pathology or tenderness to exam. No sternal pathology or tenderness to exam. No crepitus, no ecchymosis, no hematoma. No penetrating thorcoabdominal trauma  Respiratory: Rate is 18; Respiratory Effort normal; no wheezes, rales or rhonchi to exam  ABD: bowel sounds (+), soft, nontender, non distended, no rebound, no guarding, no rigidity, no skin changes to exam. No pelvic instability to exam, no skin changes  Musculoskeletal: Pt has palpable b/l radial, femoral, dorsalis pedis pulses. All digits are warm and well perfused. No gross long bone pathology or tenderness to exam. Pt demonstrates grossly intact sensoromotor function. Pt has good capillary refill to digits, no calf edema or tenderness to exam.  Skin: no lesions or rashes to exam

## 2021-05-31 NOTE — ED PROVIDER NOTE - CLINICAL SUMMARY MEDICAL DECISION MAKING FREE TEXT BOX
complicated facial lac, not involving eye nor eyelid nor lacrimal gland. va 20/20 R eye. plastics for repair  syncopal episode with q waves anteriorly

## 2021-05-31 NOTE — ED PROVIDER NOTE - OBJECTIVE STATEMENT
Pertinent HPI/PMH/PSH/FHx/SHx and Review of Systems contained within  HPI:  Patient p/w CC  head trauma after syncope, new onset. pt was in shower, after getting out felt lightheaded & doesn't remember anything else as she likely passed out.  found pt with facial lacerations. tetanus utd  PMH/PSH relevant for: none  ROS negative for: fever, Chest pain, SOB, Nausea, vomiting, diarrhea, abdominal pain, back pain  FamilyHx and SocialHx not otherwise contributory Pertinent HPI/PMH/PSH/FHx/SHx and Review of Systems contained within  HPI:  Patient p/w CC  head trauma after syncope, new onset. pt was in shower, after getting out felt lightheaded & doesn't remember anything else as she likely passed out.  found pt with facial lacerations. tetanus utd.   PMH/PSH relevant for: none  ROS negative for: fever, Chest pain, SOB, Nausea, vomiting, diarrhea, abdominal pain, back pain  FamilyHx and SocialHx not otherwise contributory Pertinent HPI/PMH/PSH/FHx/SHx and Review of Systems contained within  HPI:  Patient p/w CC  head trauma after syncope, new onset. pt was in shower, after getting out felt lightheaded & doesn't remember anything else as she likely passed out.  found pt with facial lacerations. tetanus utd. also states has LE tick bites but no rash  PMH/PSH relevant for: none  ROS negative for: fever, Chest pain, SOB, Nausea, vomiting, diarrhea, abdominal pain, back pain  FamilyHx and SocialHx not otherwise contributory

## 2021-05-31 NOTE — ED ADULT TRIAGE NOTE - CHIEF COMPLAINT QUOTE
pt biba as transfer from [facility] ER s/p syncope causing facial trauma.  pt states that she completed her Moderna vaccines.

## 2021-05-31 NOTE — H&P ADULT - PROBLEM SELECTOR PLAN 1
The facial lacerations have all been irrigated, debrided and closed by plastic surgery. Right medial canthal wound is complex in nature and close to the lacrimal duct, although not frankly disrupted, cannot rule out occult injury at the inferior aspect so Opthalmology/Occuloplastic consult was suggested. All wounds were repaired and dressed with Bacitracin, Telfa and paper tape. Erythromycin ophthalmic was applied to the medial canthal wound. Due to the syncopal episode and PVCs in ED, plan was for admission for further evaluation. The facial lacerations have all been irrigated, debrided and closed by plastic surgery. Right medial canthal wound is complex in nature and close to the lacrimal duct, although not frankly disrupted, cannot rule out occult injury at the inferior aspect so Opthalmology/Occuloplastic consult was suggested. All wounds were repaired and dressed with Bacitracin, Telfa and paper tape. Erythromycin ophthalmic was applied to the medial canthal wound. Per plastics, patient should also receive PO Augmentin for wounds and ER started her on Doxycycline for her tick exposure. Due to the syncopal episode and PVCs in ED, plan was for admission for further evaluation.

## 2021-05-31 NOTE — ED PROVIDER NOTE - CLINICAL SUMMARY MEDICAL DECISION MAKING FREE TEXT BOX
Impression:  Complex facial laceration and possible lacrimal duct laceration due to trauma sustained after a syncopal episode.  Plan:  Occuplastic consul for lacrimal duct evaluation.  likely tele admit for syncope evaluation.

## 2021-05-31 NOTE — H&P ADULT - PROBLEM SELECTOR PLAN 2
To be monitored overnight on telemetry for PVCs seen while in ED. Cardiology to be consulted.    Case d/w Dr. Hampton. Patient to be admitted to [address] [place] [address].

## 2021-05-31 NOTE — ED PROVIDER NOTE - CARE PLAN
Principal Discharge DX:	Syncope  Secondary Diagnosis:	Head trauma  Secondary Diagnosis:	Facial laceration   Principal Discharge DX:	Syncope  Secondary Diagnosis:	Head trauma  Secondary Diagnosis:	Facial laceration  Secondary Diagnosis:	PVC (premature ventricular contraction)

## 2021-05-31 NOTE — ED PROVIDER NOTE - PROGRESS NOTE DETAILS
aimee: awaiting plastics. pt informed about ct results. have frequent pvcs on ekg so will admit aimee: dr wiseman will be in for lac repair aimee: dr monson trauma will consult on pt but rec's medicine admission. plastic surgery at bedside repairing lac. no acute management for fx. dr vázquez will admit aimee: awaiting plastics. pt informed about ct results. have frequent pvcs on tele so will admit aimee: Discussed need to admit with patient & discussed risk and benefits.  Patient agreed to admission.  Discussed case w/ admitting doctor - agreed to admit to their service. will place bridge orders. Accepting physician said: APPROVE PT TO MOVE   prior to inpatient team evaluation aimee:  dr wiseman rec's ophthalmology eval, dr monson arranged transfer to [facility]

## 2021-05-31 NOTE — CONSULT NOTE ADULT - ASSESSMENT
A/P:  Syncope  Facial lacerations s/p plastic surgery repair  Open right nasal maxillary fracture, plastic surgeon has advised occuloplastic specialist evaluation for possible lacrimal duct injury  Tetanus up to date  IV antibiotics  Pt for transfer to tertiary care center for occuloplastic evaluation  Pt accepted at [facility] by Dr Fontanez  Pt and  aware of and agrees with all of the above

## 2021-05-31 NOTE — ED PROVIDER NOTE - PROGRESS NOTE DETAILS
Attending note (Ladarius): patient seen/evaluated by optho service, no evidence of lacrimal duct tear/trauma; rec'd erythromycin ointment and can f/u outpatient.  Pt cleared by trauma surgery.  Will now recommend admission given syncope (eval for cardiogenic etiology) given description to me at signout of event; patient accepted to Dr Hampton as he knows patient/family.

## 2021-05-31 NOTE — H&P ADULT - NSHPPOAURINARYCATHETER_GEN_ALL_CORE
Xrays negative for acute findings or fractures.  Continue conservative cares- rest, ice, elevate, brace/support wrap, gradual return to activity.  Consider physical therapy for strengthening, etc.  Consider MRI if not improving.   no

## 2021-05-31 NOTE — ED ADULT NURSE NOTE - CHIEF COMPLAINT QUOTE
pt biba as transfer from [facility] ER s/p syncope causing facial trauma.  pt states that she completed her Moderna vaccines.

## 2021-05-31 NOTE — ED PROVIDER NOTE - PHYSICAL EXAMINATION
*GEN: No acute distress, well appearing   *HEAD: 3 facial lacerations: 3cm horizontal laceration above R eyebrow, 2cm laceration above lip not involving vermillion border but almost through & throught to internal mucosa. complicated deep laceation medial to R eye but lateral to nose, not inolving lacrimal gland, VA 20/20 R eye  *EYES/NOSE: b/l Pupils symmetric & Reactive to ligth, EOMI b/l  *THROAT: airway patent, moist mucous membranes  *NECK: Neck supple  *PULMONARY: No Respiratory distress, symmetric b/l chest rise  *CARDIAC: s1s2, regular rhythm   *ABDOMEN:  Non Tender, Non Distended, soft, no guarding, no rebound, no masses   *BACK: no CVA tenderness, No midline vertebral tenderness to palpation   *EXTREMITIES: symmetric pulses, 2+ DP & radial pulses, no cyanosis, no edema   *SKIN:   *NEUROLOGIC: alert,  full active & passive ROM in all 4 extremities,   *PSYCH: appropriate concern about symptoms, pleasant *GEN: No acute distress, well appearing   *HEAD: 3 facial lacerations: 3cm horizontal laceration above R eyebrow, 2cm laceration above lip not involving vermillion border but almost through & throught to internal mucosa. complicated deep laceation medial to R eye but lateral to nose, not inolving lacrimal gland, VA 20/20 R eye  *EYES/NOSE: b/l Pupils symmetric & Reactive to ligth, EOMI b/l  *THROAT: airway patent, moist mucous membranes  *NECK: Neck supple  *PULMONARY: No Respiratory distress, symmetric b/l chest rise  *CARDIAC: s1s2, regular rhythm   *ABDOMEN:  Non Tender, Non Distended, soft, no guarding, no rebound, no masses   *BACK: no CVA tenderness, No midline vertebral tenderness to palpation   *EXTREMITIES: symmetric pulses, 2+ DP & radial pulses, no cyanosis, no edema   *SKIN: LE tick bites w/o rash  *NEUROLOGIC: alert,  full active & passive ROM in all 4 extremities,   *PSYCH: appropriate concern about symptoms, pleasant

## 2021-05-31 NOTE — ED ADULT NURSE NOTE - NSIMPLEMENTINTERV_GEN_ALL_ED
Implemented All Fall Risk Interventions:  [place] to call system. Call bell, personal items and telephone within reach. Instruct patient to call for assistance. Room bathroom lighting operational. Non-slip footwear when patient is off stretcher. Physically safe environment: no spills, clutter or unnecessary equipment. Stretcher in lowest position, wheels locked, appropriate side rails in place. Provide visual cue, wrist band, yellow gown, etc. Monitor gait and stability. Monitor for mental status changes and reorient to person, place, and time. Review medications for side effects contributing to fall risk. Reinforce activity limits and safety measures with patient and family.

## 2021-05-31 NOTE — H&P ADULT - NSHPLABSRESULTS_GEN_ALL_CORE
CT HEAD: Moderate right frontal scalp hematoma with a few punctate foci of subcutaneous emphysema which may correspond with associated laceration. There is no acute intracranial hemorrhage or depressed calvarial fracture.    CT CERVICAL SPINE: No fracture or acute traumatic malalignment.    CT maxillofacial: Displaced and comminuted fracture of the right maxillary nasal process. There is associated overlyingright nasal soft tissue swelling and subcutaneous emphysema. There is also right facial and mild inferior medial right periorbital hematoma. The bony orbits and globes are intact. No retrobulbar hematoma.

## 2021-05-31 NOTE — CONSULT NOTE ADULT - SUBJECTIVE AND OBJECTIVE BOX
CC:Patient is a 65y old  Female who presents with a chief complaint of syncope    Subjective:  Patient p/w CC  head trauma after syncope, new onset. pt was in shower, after getting out felt lightheaded & doesn't remember anything else as she likely passed out.  found pt with facial lacerations.    Pt seen and examined at bedside with chaperone. Pt is AAOx3, pt in no acute distress. Pt states h/o prior syncopal episodes with workup by her neurologist in past. Pt denied c/o fever, chills, chest pain, SOB, abd pain, N/V/D, extremity pain or dysfunction, hemoptysis, hematemesis, hematuria, hematochexia, headache, diplopia, vertigo, dizzyness.       ROS: as abovementioned otherwise negative    PMH: syncope  PSH: csxn, hand surgery  No Known Allergies    SH: social etoh, denied tobacco or illicit drug use  FH: cad    Vital Signs Last 24 Hrs  T(C): 36.8 (31 May 2021 10:52), Max: 36.8 (31 May 2021 07:53)  T(F): 98.2 (31 May 2021 10:52), Max: 98.3 (31 May 2021 07:53)  HR: 97 (31 May 2021 10:52) (84 - 97)  BP: 111/58 (31 May 2021 10:52) (95/55 - 134/64)  BP(mean): 70 (31 May 2021 10:52) (65 - 70)  RR: 18 (31 May 2021 10:52) (15 - 20)  SpO2: 100% (31 May 2021 10:52) (100% - 100%)    Labs:      CARDIAC MARKERS ( 31 May 2021 11:06 )  <0.015 ng/mL / x     / x     / x     / x      CARDIAC MARKERS ( 31 May 2021 08:24 )  <0.015 ng/mL / x     / 152 U/L / x     / x                                12.1   16.28 )-----------( 270      ( 31 May 2021 08:24 )             36.2     CBC Full  -  ( 31 May 2021 08:24 )  WBC Count : 16.28 K/uL  RBC Count : 3.98 M/uL  Hemoglobin : 12.1 g/dL  Hematocrit : 36.2 %  Platelet Count - Automated : 270 K/uL  Mean Cell Volume : 91.0 fl  Mean Cell Hemoglobin : 30.4 pg  Mean Cell Hemoglobin Concentration : 33.4 gm/dL  Auto Neutrophil # : 14.19 K/uL  Auto Lymphocyte # : 1.03 K/uL  Auto Monocyte # : 0.87 K/uL  Auto Eosinophil # : 0.06 K/uL  Auto Basophil # : 0.07 K/uL  Auto Neutrophil % : 87.2 %  Auto Lymphocyte % : 6.3 %  Auto Monocyte % : 5.3 %  Auto Eosinophil % : 0.4 %  Auto Basophil % : 0.4 %    05-31    133<L>  |  103  |  7   ----------------------------<  138<H>  3.6   |  22  |  0.67    Ca    9.1      31 May 2021 08:24    TPro  7.6  /  Alb  3.9  /  TBili  0.3  /  DBili  x   /  AST  21  /  ALT  26  /  AlkPhos  70  05-31    LIVER FUNCTIONS - ( 31 May 2021 08:24 )  Alb: 3.9 g/dL / Pro: 7.6 gm/dL / ALK PHOS: 70 U/L / ALT: 26 U/L / AST: 21 U/L / GGT: x                 Meds:  acetaminophen   Tablet .. 650 milliGRAM(s) Oral every 6 hours PRN  erythromycin   Ointment 1 Application(s) Right EYE once  ondansetron Injectable 4 milliGRAM(s) IV Push every 6 hours PRN      Radiology:  < from: Xray Chest 1 View AP/PA. (05.31.21 @ 09:09) >    EXAM:  XR CHEST 1 VIEW                            PROCEDURE DATE:  05/31/2021          INTERPRETATION:  Clinical Information: Head trauma    Technique: AP chest image.    Comparison: 05/31/2021    Findings/  Impression: The heart is unremarkable. The lungs are clear. Bones are unremarkable for age.            REGGIE KITCHEN MD; Attending Interventional Radiologist  This document has been electronically signed. May 31 2021 11:25AM    < end of copied text >  < from: CT 3D Reconstruct w/o Workstation (05.31.21 @ 09:04) >  EXAM:  CT MAXILLOFACIAL                          EXAM:  CT 3D RECONSTRUCT ISRA LEBRON                          EXAM:  CT BRAIN                          EXAM:  CT CERVICAL SPINE                            PROCEDURE DATE:  05/31/2021          INTERPRETATION:  CT OF THE HEAD WITHOUT CONTRAST  CT CERVICAL SPINE WITHOUT CONTRAST  CT MAXILLOFACIAL WITHOUT CONTRAST    CLINICAL INDICATION: Fall. Head trauma.    TECHNIQUE: Volumetric CT acquisition was performed through the brain and reviewed using brain and bone window technique. Dose optimization techniques were utilized including kVp/mA modulation along with iterative reconstructions.  Thin section CT images were obtained through cervical spine with overlapping reconstructions.  Sagittal, coronal and bilateral oblique 2D reformats were then generated from the initial images. Dose reduction techniques were utilized including kVp/mA dose modulation based on patient size and iterative reconstruction.  Axial CT imaging was performed through the facial bones without the administration of intravenous contrast.. Coronal and sagittal reformatted images were also obtained. Dose optimization techniques were utilized including kVp/mA modulation along with iterative reconstructions.   3-D reconstructions of the facial bones were performed on a separate workstation and reviewed.    COMPARISON: CT head 12/13/2020    FINDINGS:  CT head:  Moderate right frontal scalp hematoma. There are a few punctate foci of subcutaneous emphysema which may correspond with an associated laceration.  The ventricular and sulcal size and configuration is age appropriate.   There is no acute loss of gray-white differentiation. Chronic right basal ganglia lacunar infarct.    There is no evidence of mass effect, midline shift, acute intracranial hemorrhage, or extra-axial collections.     The calvarium is intact.      CT cervical spine:    There is maintenance of usual cervical lordosis. There is no significant subluxation. Vertebral body height is preserved throughout the cervical spine. There is no significant loss of intervertebral disc height throughout the cervical spine.    There is no definite high-grade central canal stenosis.  The paravertebral soft tissues are unremarkable.    CT maxillofacial:  Displaced and comminuted fracture of the right maxillary nasal process. There is overlying right nasal soft tissue swelling and subcutaneous emphysema. There is also right facial and mild inferior medial right periorbital hematoma.    The bony orbits areintact. The mandible, maxilla, zygoma and pterygoid plates are intact.    The globes are symmetric and intact. There is no retrobulbar hematoma. The extraocular muscles and optic nerve sheath complexes are unremarkable.    There are no fluid levels within the paranasal sinuses. The visualized mastoid air cells are clear.      IMPRESSION:  CT HEAD: Moderate right frontal scalp hematoma with a few punctate foci of subcutaneous emphysema which may correspond with associated laceration. There is no acute intracranial hemorrhage or depressed calvarial fracture.    CT CERVICAL SPINE: No fracture or acute traumatic malalignment.    CT maxillofacial: Displaced and comminuted fracture of the right maxillary nasal process. There is associated overlyingright nasal soft tissue swelling and subcutaneous emphysema. There is also right facial and mild inferior medial right periorbital hematoma. The bony orbits and globes are intact. No retrobulbar hematoma.            CHIP GE M.D., ATTENDING RADIOLOGIST  This document has been electronically signed. May 31 2021  9:27AM    < end of copied text >      Physical exam:  GCS of 15  Airway is patent  Breathing is symmetric and unlabored  Neuro: CNII-XII grossly intact  Psych: normal affect  HEENT: Normocephalic, multiple facial lacerations s/p repair by plastic surgery, right proximal lateral nasal open fracture/laceration being closed/repaired by plastic surgery, SASKIA, EOM wnl, no otorrhea b/l, no epistaxis or d/c b/l nares, no gross craniofacial bony pathology or tenderness b/l otherwise  Neck: Soft and supple, nontender to exam. No crepitus, no ecchymosis, no hematoma, to exam, no JVD, no tracheal deviation  Cspine/thoracolumbrosacral spine: no gross bony pathology or tenderness to exam  Cardiovascular: S1S2 Present  Chest: no gross rib pathology or tenderness to exam. No sternal pathology or tenderness to exam. No crepitus, no ecchymosis, no hematoma. No penetrating thorcoabdominal trauma  Respiratory: Rate is 18; Respiratory Effort normal; no wheezes, rales or rhonchi to exam  ABD: bowel sounds (+), soft, nontender, non distended, no rebound, no guarding, no rigidity, no skin changes to exam. No pelvic instability to exam, no skin changes  Musculoskeletal: Pt has palpable b/l radial, femoral, dorsalis pedis pulses. All digits are warm and well perfused. No gross long bone pathology or tenderness to exam. Pt demonstrates grossly intact sensoromotor function. Pt has good capillary refill to digits, no calf edema or tenderness to exam.  Skin: no lesions or rashes to exam       CC:Patient is a 65y old  Female who presents with a chief complaint of syncope    Attending bedside 1140am    Subjective:  Patient p/w CC  head trauma after syncope, new onset. pt was in shower, after getting out felt lightheaded & doesn't remember anything else as she likely passed out.  found pt with facial lacerations.    Pt seen and examined at bedside with chaperone. Pt is AAOx3, pt in no acute distress. Pt states h/o prior syncopal episodes with workup by her neurologist in past. Pt denied c/o fever, chills, chest pain, SOB, abd pain, N/V/D, extremity pain or dysfunction, hemoptysis, hematemesis, hematuria, hematochexia, headache, diplopia, vertigo, dizzyness.       ROS: as abovementioned otherwise negative    PMH: syncope  PSH: csxn, hand surgery  No Known Allergies    SH: social etoh, denied tobacco or illicit drug use  FH: cad    Vital Signs Last 24 Hrs  T(C): 36.8 (31 May 2021 10:52), Max: 36.8 (31 May 2021 07:53)  T(F): 98.2 (31 May 2021 10:52), Max: 98.3 (31 May 2021 07:53)  HR: 97 (31 May 2021 10:52) (84 - 97)  BP: 111/58 (31 May 2021 10:52) (95/55 - 134/64)  BP(mean): 70 (31 May 2021 10:52) (65 - 70)  RR: 18 (31 May 2021 10:52) (15 - 20)  SpO2: 100% (31 May 2021 10:52) (100% - 100%)    Labs:      CARDIAC MARKERS ( 31 May 2021 11:06 )  <0.015 ng/mL / x     / x     / x     / x      CARDIAC MARKERS ( 31 May 2021 08:24 )  <0.015 ng/mL / x     / 152 U/L / x     / x                                12.1   16.28 )-----------( 270      ( 31 May 2021 08:24 )             36.2     CBC Full  -  ( 31 May 2021 08:24 )  WBC Count : 16.28 K/uL  RBC Count : 3.98 M/uL  Hemoglobin : 12.1 g/dL  Hematocrit : 36.2 %  Platelet Count - Automated : 270 K/uL  Mean Cell Volume : 91.0 fl  Mean Cell Hemoglobin : 30.4 pg  Mean Cell Hemoglobin Concentration : 33.4 gm/dL  Auto Neutrophil # : 14.19 K/uL  Auto Lymphocyte # : 1.03 K/uL  Auto Monocyte # : 0.87 K/uL  Auto Eosinophil # : 0.06 K/uL  Auto Basophil # : 0.07 K/uL  Auto Neutrophil % : 87.2 %  Auto Lymphocyte % : 6.3 %  Auto Monocyte % : 5.3 %  Auto Eosinophil % : 0.4 %  Auto Basophil % : 0.4 %    05-31    133<L>  |  103  |  7   ----------------------------<  138<H>  3.6   |  22  |  0.67    Ca    9.1      31 May 2021 08:24    TPro  7.6  /  Alb  3.9  /  TBili  0.3  /  DBili  x   /  AST  21  /  ALT  26  /  AlkPhos  70  05-31    LIVER FUNCTIONS - ( 31 May 2021 08:24 )  Alb: 3.9 g/dL / Pro: 7.6 gm/dL / ALK PHOS: 70 U/L / ALT: 26 U/L / AST: 21 U/L / GGT: x                 Meds:  acetaminophen   Tablet .. 650 milliGRAM(s) Oral every 6 hours PRN  erythromycin   Ointment 1 Application(s) Right EYE once  ondansetron Injectable 4 milliGRAM(s) IV Push every 6 hours PRN      Radiology:  < from: Xray Chest 1 View AP/PA. (05.31.21 @ 09:09) >    EXAM:  XR CHEST 1 VIEW                            PROCEDURE DATE:  05/31/2021          INTERPRETATION:  Clinical Information: Head trauma    Technique: AP chest image.    Comparison: 05/31/2021    Findings/  Impression: The heart is unremarkable. The lungs are clear. Bones are unremarkable for age.            REGGIE KITCHEN MD; Attending Interventional Radiologist  This document has been electronically signed. May 31 2021 11:25AM    < end of copied text >  < from: CT 3D Reconstruct w/o Workstation (05.31.21 @ 09:04) >  EXAM:  CT MAXILLOFACIAL                          EXAM:  CT 3D RECONSTRUCT ISRA LEBRON                          EXAM:  CT BRAIN                          EXAM:  CT CERVICAL SPINE                            PROCEDURE DATE:  05/31/2021          INTERPRETATION:  CT OF THE HEAD WITHOUT CONTRAST  CT CERVICAL SPINE WITHOUT CONTRAST  CT MAXILLOFACIAL WITHOUT CONTRAST    CLINICAL INDICATION: Fall. Head trauma.    TECHNIQUE: Volumetric CT acquisition was performed through the brain and reviewed using brain and bone window technique. Dose optimization techniques were utilized including kVp/mA modulation along with iterative reconstructions.  Thin section CT images were obtained through cervical spine with overlapping reconstructions.  Sagittal, coronal and bilateral oblique 2D reformats were then generated from the initial images. Dose reduction techniques were utilized including kVp/mA dose modulation based on patient size and iterative reconstruction.  Axial CT imaging was performed through the facial bones without the administration of intravenous contrast.. Coronal and sagittal reformatted images were also obtained. Dose optimization techniques were utilized including kVp/mA modulation along with iterative reconstructions.   3-D reconstructions of the facial bones were performed on a separate workstation and reviewed.    COMPARISON: CT head 12/13/2020    FINDINGS:  CT head:  Moderate right frontal scalp hematoma. There are a few punctate foci of subcutaneous emphysema which may correspond with an associated laceration.  The ventricular and sulcal size and configuration is age appropriate.   There is no acute loss of gray-white differentiation. Chronic right basal ganglia lacunar infarct.    There is no evidence of mass effect, midline shift, acute intracranial hemorrhage, or extra-axial collections.     The calvarium is intact.      CT cervical spine:    There is maintenance of usual cervical lordosis. There is no significant subluxation. Vertebral body height is preserved throughout the cervical spine. There is no significant loss of intervertebral disc height throughout the cervical spine.    There is no definite high-grade central canal stenosis.  The paravertebral soft tissues are unremarkable.    CT maxillofacial:  Displaced and comminuted fracture of the right maxillary nasal process. There is overlying right nasal soft tissue swelling and subcutaneous emphysema. There is also right facial and mild inferior medial right periorbital hematoma.    The bony orbits areintact. The mandible, maxilla, zygoma and pterygoid plates are intact.    The globes are symmetric and intact. There is no retrobulbar hematoma. The extraocular muscles and optic nerve sheath complexes are unremarkable.    There are no fluid levels within the paranasal sinuses. The visualized mastoid air cells are clear.      IMPRESSION:  CT HEAD: Moderate right frontal scalp hematoma with a few punctate foci of subcutaneous emphysema which may correspond with associated laceration. There is no acute intracranial hemorrhage or depressed calvarial fracture.    CT CERVICAL SPINE: No fracture or acute traumatic malalignment.    CT maxillofacial: Displaced and comminuted fracture of the right maxillary nasal process. There is associated overlyingright nasal soft tissue swelling and subcutaneous emphysema. There is also right facial and mild inferior medial right periorbital hematoma. The bony orbits and globes are intact. No retrobulbar hematoma.            CHIP GE M.D., ATTENDING RADIOLOGIST  This document has been electronically signed. May 31 2021  9:27AM    < end of copied text >      Physical exam:  GCS of 15  Airway is patent  Breathing is symmetric and unlabored  Neuro: CNII-XII grossly intact  Psych: normal affect  HEENT: Normocephalic, multiple facial lacerations s/p repair by plastic surgery, right proximal lateral nasal open fracture/laceration being closed/repaired by plastic surgery, SASKIA, EOM wnl, no otorrhea b/l, no epistaxis or d/c b/l nares, no gross craniofacial bony pathology or tenderness b/l otherwise  Neck: Soft and supple, nontender to exam. No crepitus, no ecchymosis, no hematoma, to exam, no JVD, no tracheal deviation  Cspine/thoracolumbrosacral spine: no gross bony pathology or tenderness to exam  Cardiovascular: S1S2 Present  Chest: no gross rib pathology or tenderness to exam. No sternal pathology or tenderness to exam. No crepitus, no ecchymosis, no hematoma. No penetrating thorcoabdominal trauma  Respiratory: Rate is 18; Respiratory Effort normal; no wheezes, rales or rhonchi to exam  ABD: bowel sounds (+), soft, nontender, non distended, no rebound, no guarding, no rigidity, no skin changes to exam. No pelvic instability to exam, no skin changes  Musculoskeletal: Pt has palpable b/l radial, femoral, dorsalis pedis pulses. All digits are warm and well perfused. No gross long bone pathology or tenderness to exam. Pt demonstrates grossly intact sensoromotor function. Pt has good capillary refill to digits, no calf edema or tenderness to exam.  Skin: no lesions or rashes to exam

## 2021-05-31 NOTE — H&P ADULT - HISTORY OF PRESENT ILLNESS
66 y/o F with no PMH, past ocular history LASIK both eyes (10 years ago) who presented as transfer from [facility] 2/2 passing out after stepping out of the shower and hit the right aspect of her face. Patient does not remember any other details.  found patient with several facial lacerations. Patient denies history of syncope in the past. Pt reports right periorbital pain (8/10 in severity) but denies any right eye pain. No visual changes. CTH revealed Compound nasal bone fracture on the right with multiple soft tissue injuries to the face including the right forehead, right medial canthal region, right lower lateral eyelid and both internal and external right upper lip wounds. The facial lacerations have all been irrigated, debrided and closed by plastic surgery. Right medial canthal wound is complex in nature and close to the lacrimal duct, although not frankly disrupted, cannot rule out occult injury at the inferior aspect so Opthalmology/Occuloplastic consult was suggested. All wounds were repaired and dressed with Bacitracin, Telfa and paper tape. Erythromycin ophthalmic was applied to the medial canthal wound. Due to the syncopal episode and PVCs in ED, plan was for admission for further evaluation.

## 2021-05-31 NOTE — H&P ADULT - ASSESSMENT
64 y/o F with no PMH, past ocular history LASIK both eyes (10 years ago) who presented as transfer from [facility] 2/2 passing out after stepping out of the shower and hit the right aspect of her face. Patient does not remember any other details.  found patient with several facial lacerations. Patient denies history of syncope in the past. Pt reports right periorbital pain (8/10 in severity) but denies any right eye pain. No visual changes. CTH revealed Compound nasal bone fracture on the right with multiple soft tissue injuries to the face including the right forehead, right medial canthal region, right lower lateral eyelid and both internal and external right upper lip wounds. The facial lacerations have all been irrigated, debrided and closed by plastic surgery. Right medial canthal wound is complex in nature and close to the lacrimal duct, although not frankly disrupted, cannot rule out occult injury at the inferior aspect so Opthalmology/Occuloplastic consult was suggested. All wounds were repaired and dressed with Bacitracin, Telfa and paper tape. Erythromycin ophthalmic was applied to the medial canthal wound. Due to the syncopal episode and PVCs in ED, plan was for admission for further evaluation.

## 2021-05-31 NOTE — CONSULT NOTE ADULT - ASSESSMENT
65F with no PMH, remote history of fall 2/2 vasovagal ~20 years ago, presents after fall, found to have R frontal scalp hematoma, displaced fracture of R maxillary nasal process with periorbital hematoma    PLAN:   - No acute surgical intervention  - obtain Xrays of right shoulder, hand, wrist, chest, and pelvis   - follow up with plastic surgery/ ophthalmology regarding facial fracture  - Tertiary survey tomorrow  - Plan discussed with attending, Dr. La    Trauma surgery  [id number] 65F with no PMH, remote history of fall 2/2 vasovagal ~20 years ago, presents after fall, found to have R frontal scalp hematoma, displaced fracture of R maxillary nasal process with periorbital hematoma    PLAN:   - No acute surgical intervention  - obtain Xrays of right shoulder, hand, wrist, chest, and pelvis   - recommend holding anticoagulation/ DVT ppx until xrays  - follow up with plastic surgery/ ophthalmology regarding facial fracture  - Tertiary survey tomorrow  - Plan discussed with attending, Dr. aL    Trauma surgery  [id number] 65F with no PMH, remote history of fall 2/2 vasovagal ~20 years ago, presents after fall, found to have R frontal scalp hematoma, displaced fracture of R maxillary nasal process with periorbital hematoma    PLAN:   - No acute surgical intervention  - obtain Xrays of right shoulder, hand, wrist, chest, and pelvis   - recommend holding anticoagulation/ DVT ppx until xrays  - follow up with plastic surgery/ ophthalmology regarding facial fracture  - syncope workup  - Tertiary survey tomorrow  - Plan discussed with attending, Dr. La    Trauma surgery  [id number]

## 2021-05-31 NOTE — ED ADULT NURSE NOTE - NSIMPLEMENTINTERV_GEN_ALL_ED
Implemented All Universal Safety Interventions:  [place] to call system. Call bell, personal items and telephone within reach. Instruct patient to call for assistance. Room bathroom lighting operational. Non-slip footwear when patient is off stretcher. Physically safe environment: no spills, clutter or unnecessary equipment. Stretcher in lowest position, wheels locked, appropriate side rails in place.

## 2021-05-31 NOTE — ED ADULT NURSE NOTE - OBJECTIVE STATEMENT
pt is 64 yo female presents to ED for syncopal episode. pt states she was getting out of the shower and passed out.  +facial lac above the right eyebrow, +lac noted over the right lip.  pt states not on blood thinners, MD yu applied c-collar.

## 2021-05-31 NOTE — ED PROVIDER NOTE - NS ED ROS FT
Review of Systems:  	•	CONSTITUTIONAL: no fever  	•	SKIN: lac  	•	RESPIRATORY: no shortness of breath  	•	CARDIAC: no chest pain  	•	GI:  no abd pain, no nausea, no vomiting, no diarrhea  	•	GENITO-URINARY:  no dysuria  	•	MUSCULOSKELETAL:  no back pain  	•	NEUROLOGIC: no weakness, ha  	•	ALLERGY: no rhinorrhea  	•	PSYSCHIATRIC: appropriate concern about symptoms

## 2021-05-31 NOTE — ED ADULT TRIAGE NOTE - CHIEF COMPLAINT QUOTE
pt presents to ED due to syncopal episode while in the shower approximately  2 hours PTA pt with lac to forehead and ecchymotic to facial nasal bones and right eye pt denies any blood thinners

## 2021-05-31 NOTE — ED ADULT NURSE NOTE - OBJECTIVE STATEMENT
66 yo female BIB EMS trx from [facility] for opthalmology consult. Pt was in the bathroom this morning and suddenly felt weak and dizzy, experiencing a syncopal episode.  Pt st she believes she hit face on vanity of bathroom, unknown LOC time, unwitnessed fall. Pt has no known PMH. Imaging and [place] reveals displaced and comminuted fx of right maxillary nasal process. Sent to [facility] for opthalmology consult due to concern of fx proximity to lacrimal gland per EMS. On arrival pt c/o headache, st was administered Tylenol which provided relief. Pt denies chx pn, SOB, abd pn, n/v/d/dizziness on arrival. Vs stable.

## 2021-05-31 NOTE — CONSULT NOTE ADULT - SUBJECTIVE AND OBJECTIVE BOX
[facility] DEPARTMENT OF OPHTHALMOLOGY - INITIAL ADULT CONSULT  -----------------------------------------------------------------------------------------------------------------  Didier Cristina MD PGY 2  Pager: [phone]  -----------------------------------------------------------------------------------------------------------------    HPI: Patient is a 66 y/o F with no PMH, past ocular history LASIK both eyes (10 years ago) who presented as transfer from [facility] 2/2 passing out after stepping out of the shower and hit the right aspect of her face. Patient does not remember any other details.  found patient with several facial lacerations. Pt reports right periorbital pain (8/10 in severity) but denies any right eye pain. No visual changes.     Past Medical History: None  Past Ocular History: LASIK OU 10 years ago  Drops: None  Allergies: No known allergies to medications  Family History: No family history of eye diseases    Review of Systems:  Constitutional: No fever, chills  Eyes: No blurry vision, flashes, floaters, FBS, double vision, OU  Neuro: No tremors  Cardiovascular: No chest pain, palpitations  Respiratory: No SOB, no cough  GI: No nausea, vomiting, abdominal pain    Vital Signs: T(C): 36.7 (05-31-21 @ 14:11)  T(F): 98.1 (05-31-21 @ 14:11), Max: 98.3 (05-31-21 @ 07:53)  HR: 92 (05-31-21 @ 14:30) (84 - 97)  BP: 127/65 (05-31-21 @ 14:30) (95/55 - 146/80)  RR:  (15 - 20)  SpO2:  (98% - 100%)  Wt(kg): --  General: AAO x 3, appropriate mood and affect    Ophthalmology Exam:  Visual acuity (cc): 20/25 OU.  Pupils: PERRL OU, no APD  Intraocular Pressure: 10, 13  Extraocular movements (EOMs): Full OU, no pain, no diplopia.  Confrontational Visual Field (CVF): Full OU.  Color Plates: 12/12 OU.    Pen Light Exam (PLE)  External: Laceration 6 mm medial to medical canthus extending inferiorly s/p repair. Laceration over R inferior brow. Periorbital ecchymosis OD. Normal OS.  Lids/Lashes/Lacrimal Ducts: Flat OU.  Sclera/Conjunctiva: White and quiet OU.  Cornea: Clear OU.  Anterior Chamber: Deep and formed OU.    Iris: Flat OU.  Lens: Clear OU.    Fundus Exam: dilated with 1% tropicamide and 2.5% phenylephrine 3:33pm  Approval obtained from primary team for dilation  Patient aware that pupils can remained dilated for at least 4-6 hours.  Exam performed with 20 D lens    _________________    Labs/Imaging:  CT HEAD: Moderate right frontal scalp hematoma with a few punctate foci of subcutaneous emphysema which may correspond with associated laceration. There is no acute intracranial hemorrhage or depressed calvarial fracture.  CT CERVICAL SPINE: No fracture or acute traumatic malalignment.  CT maxillofacial: Displaced and comminuted fracture of the right maxillary nasal process. There is associated overlying right nasal soft tissue swelling and subcutaneous emphysema. There is also right facial and mild inferior medial right periorbital hematoma. The bony orbits and globes are intact. No retrobulbar hematoma.    Assessment and Recommendations:  65y female with a past medical history/ocular history of LASIK both eyes consulted for laceration medial to R medial canthus.    1. Facial laceration  -Patient presented with displaced and comminuted fracture of the right maxillary nasal process along with several facial lacerations s/p syncope today.  -Patient seen at [facility] where laceration medial to medial canthus repaired by plastics.    Seen and discussed with ***.    Outpatient Follow-up: Patient should follow-up with his/her ophthalmologist or with [facility] Department of Ophthalmology within 1 week of after discharge at:    [address]  [place] [address]  [phone]    Didier Cristina MD, PGY-2  Pager: [phone]  Also available on Microsoft Teams       [facility] DEPARTMENT OF OPHTHALMOLOGY - INITIAL ADULT CONSULT  -----------------------------------------------------------------------------------------------------------------  Didier Cristina MD PGY 2  Pager: [phone]  -----------------------------------------------------------------------------------------------------------------    HPI: Patient is a 64 y/o F with no PMH, past ocular history LASIK both eyes (10 years ago) who presented as transfer from [facility] 2/2 passing out after stepping out of the shower and hit the right aspect of her face. Patient does not remember any other details.  found patient with several facial lacerations. Pt reports right periorbital pain (8/10 in severity) but denies any right eye pain. No visual changes.     Past Medical History: None  Past Ocular History: LASIK OU 10 years ago  Drops: None  Allergies: No known allergies to medications  Family History: No family history of eye diseases    Review of Systems:  Constitutional: No fever, chills  Eyes: No blurry vision, flashes, floaters, FBS, double vision, OU  Neuro: No tremors  Cardiovascular: No chest pain, palpitations  Respiratory: No SOB, no cough  GI: No nausea, vomiting, abdominal pain    Vital Signs: T(C): 36.7 (05-31-21 @ 14:11)  T(F): 98.1 (05-31-21 @ 14:11), Max: 98.3 (05-31-21 @ 07:53)  HR: 92 (05-31-21 @ 14:30) (84 - 97)  BP: 127/65 (05-31-21 @ 14:30) (95/55 - 146/80)  RR:  (15 - 20)  SpO2:  (98% - 100%)  Wt(kg): --  General: AAO x 3, appropriate mood and affect    Ophthalmology Exam:  Visual acuity (cc): 20/25 OU.  Pupils: PERRL OU, no APD  Intraocular Pressure: 10, 13  Extraocular movements (EOMs): Full OU, no pain, no diplopia.  Confrontational Visual Field (CVF): Full OU.  Color Plates: 12/12 OU.    Pen Light Exam (PLE)  External: Laceration 6 mm medial to medical canthus extending inferiorly s/p repair, not involving lid margin or medial canthus. Laceration s/p repair 2 mm superior to lid, not involving lid margin. Laceration over R inferior brow. Periorbital ecchymosis OD. Normal OS.  Lids/Lashes/Lacrimal Ducts: Flat OU.  Sclera/Conjunctiva: White and quiet OU.  Cornea: Clear OU.  Anterior Chamber: Deep and formed OU.    Iris: Flat OU.  Lens: Clear OU.    Fundus Exam: dilated with 1% tropicamide and 2.5% phenylephrine 3:33pm  Approval obtained from primary team for dilation  Patient aware that pupils can remained dilated for at least 4-6 hours.  Exam performed with 20 D lens    Vitreous: within normal limits OU  Disc, cup/disc: sharp and pink, 0.4 OU  Macula: within normal limits OU  Vessels: within normal limits OU    Labs/Imaging:  CT HEAD: Moderate right frontal scalp hematoma with a few punctate foci of subcutaneous emphysema which may correspond with associated laceration. There is no acute intracranial hemorrhage or depressed calvarial fracture.  CT CERVICAL SPINE: No fracture or acute traumatic malalignment.  CT maxillofacial: Displaced and comminuted fracture of the right maxillary nasal process. There is associated overlying right nasal soft tissue swelling and subcutaneous emphysema. There is also right facial and mild inferior medial right periorbital hematoma. The bony orbits and globes are intact. No retrobulbar hematoma.    Assessment and Recommendations:  65y female with a past medical history/ocular history of LASIK both eyes consulted for laceration medial to R medial canthus.    1. Right sided Facial laceration  -Patient presented with displaced and comminuted fracture of the right maxillary nasal process along with several facial lacerations s/p syncope today. Patient seen at [facility] where laceration medial to medial canthus repaired by plastics.  -Laceration 6 mm medial to medical canthus extending inferiorly s/p repair, not involving lid margin or medial canthus. Laceration s/p repair 2 mm superior to lid, not involving lid margin.  -Dye disappearance test symmetric, within normal limits.  -Patient probed and irrigated with canalicular probe and canalicular system patent indicating no canalicular damage.      Discussed with Dr. Winn, oculoplastics. Seen and discussed with Dr. Nuñez, PGY3.    Outpatient Follow-up: Patient should follow-up with his/her ophthalmologist or with [facility] Department of Ophthalmology within 1 week of after discharge at:    [address]  [place] [address]  [phone]    Didier Cristina MD, PGY-2  Pager: [phone]  Also available on Microsoft Teams       [facility] DEPARTMENT OF OPHTHALMOLOGY - INITIAL ADULT CONSULT  -----------------------------------------------------------------------------------------------------------------  Didier Cristina MD PGY 2  Pager: [phone]  -----------------------------------------------------------------------------------------------------------------    HPI: Patient is a 66 y/o F with no PMH, past ocular history LASIK both eyes (10 years ago) who presented as transfer from [facility] 2/2 passing out after stepping out of the shower and hit the right aspect of her face. Patient does not remember any other details.  found patient with several facial lacerations. Pt reports right periorbital pain (8/10 in severity) but denies any right eye pain. No visual changes.     Past Medical History: None  Past Ocular History: LASIK OU 10 years ago  Drops: None  Allergies: No known allergies to medications  Family History: No family history of eye diseases    Review of Systems:  Constitutional: No fever, chills  Eyes: No blurry vision, flashes, floaters, FBS, double vision, OU  Neuro: No tremors  Cardiovascular: No chest pain, palpitations  Respiratory: No SOB, no cough  GI: No nausea, vomiting, abdominal pain    Vital Signs: T(C): 36.7 (05-31-21 @ 14:11)  T(F): 98.1 (05-31-21 @ 14:11), Max: 98.3 (05-31-21 @ 07:53)  HR: 92 (05-31-21 @ 14:30) (84 - 97)  BP: 127/65 (05-31-21 @ 14:30) (95/55 - 146/80)  RR:  (15 - 20)  SpO2:  (98% - 100%)  Wt(kg): --  General: AAO x 3, appropriate mood and affect    Ophthalmology Exam:  Visual acuity (cc): 20/25 OU.  Pupils: PERRL OU, no APD  Intraocular Pressure: 10, 13  Extraocular movements (EOMs): Full OU, no pain, no diplopia.  Confrontational Visual Field (CVF): Full OU.  Color Plates: 12/12 OU.    Pen Light Exam (PLE)  External: Laceration 6 mm medial to medical canthus extending inferiorly s/p repair, not involving lid margin or medial canthus. Laceration s/p repair 2 mm superior to lid, not involving lid margin. Laceration over R inferior brow. Periorbital ecchymosis OD. Normal OS.  Lids/Lashes/Lacrimal Ducts: Flat OU.  Sclera/Conjunctiva: White and quiet OU.  Cornea: Clear OU.  Anterior Chamber: Deep and formed OU.    Iris: Flat OU.  Lens: Clear OU.    Fundus Exam: dilated with 1% tropicamide and 2.5% phenylephrine 3:33pm  Approval obtained from primary team for dilation  Patient aware that pupils can remained dilated for at least 4-6 hours.  Exam performed with 20 D lens    Vitreous: within normal limits OU  Disc, cup/disc: sharp and pink, 0.4 OU  Macula: within normal limits OU  Vessels: within normal limits OU    Labs/Imaging:  CT HEAD: Moderate right frontal scalp hematoma with a few punctate foci of subcutaneous emphysema which may correspond with associated laceration. There is no acute intracranial hemorrhage or depressed calvarial fracture.  CT CERVICAL SPINE: No fracture or acute traumatic malalignment.  CT maxillofacial: Displaced and comminuted fracture of the right maxillary nasal process. There is associated overlying right nasal soft tissue swelling and subcutaneous emphysema. There is also right facial and mild inferior medial right periorbital hematoma. The bony orbits and globes are intact. No retrobulbar hematoma.    Assessment and Recommendations:  65y female with a past medical history/ocular history of LASIK both eyes consulted for laceration medial to R medial canthus.    1. Right sided Facial laceration  -Patient presented with displaced and comminuted fracture of the right maxillary nasal process along with several facial lacerations s/p syncope today. Patient seen at [facility] where laceration medial to medial canthus repaired by plastics.  -Laceration 6 mm medial to medical canthus extending inferiorly s/p repair, not involving lid margin or medial canthus. Laceration s/p repair 2 mm superior to lid, not involving lid margin.  -Dye disappearance test symmetric, within normal limits.  -Patient probed and irrigated with canalicular probe and canalicular system patent indicating no canalicular damage. Consent obtained and written consent placed in chart.      Discussed with Dr. Winn, oculoplastics. Seen and discussed with Dr. Nuñez, PGY3.    Outpatient Follow-up: Patient should follow-up with his/her ophthalmologist or with [facility] Department of Ophthalmology within 1 week of after discharge at:    [address]  [place] [address]  [phone]    Didier Cristina MD, PGY-2  Pager: [phone]  Also available on Microsoft Teams

## 2021-05-31 NOTE — CONSULT NOTE ADULT - SUBJECTIVE AND OBJECTIVE BOX
65 year old female presented to ER after syncopal episode at home this morning. She states that she showered and then was found by her  in another bathroom in their house but patient does not recall walking to that bathroom. She denies any other injuries other than to her face. She denies vision disturbance. She did have a bloody nose after the fall. She has been evaluated by the ED staff and I was consulted to address her facial lacerations. She had 2 COVID vaccines in . She also states that she has had several recent tick bites and has not had any evaluation or treatment for those bites.    PMHx: denies  PSHx:  section , hysterectomy and left small finger tendon surgery  Meds: none at home  Allergies: NKDA  SHx: patient is a pharmacist at [employer], denies smoking, rare EtOH, no drug use, hse states that she is up to date with her tetanus vaccine      Hospital Meds:  acetaminophen   Tablet .. 650 milliGRAM(s) Oral every 6 hours PRN  erythromycin   Ointment 1 Application(s) Right EYE once  ondansetron Injectable 4 milliGRAM(s) IV Push every 6 hours PRN      PE:   T(C): 36.7 (21 @ 14:11), Max: 36.8 (21 @ 07:53)  HR: 97 (21 @ 14:11) (84 - 97)  BP: 146/80 (21 @ 14:11) (95/55 - 146/80)  RR: 17 (21 @ 14:11) (15 - 20)  SpO2: 98% (21 @ 14:11) (98% - 100%)  Wt(kg): --     @ 07:01  -   @ 14:16  --------------------------------------------------------  IN: 2000 mL / OUT: 0 mL / NET: 2000 mL    PE:  A/O x 3, no distress  HEENT: CN 2-12 grossly intact with exception of inability to elevate right eyebrow and has some right upper dental numbness  Forehead with crescent shaped 3 cm long laceraton down to periosteum, muscle divided; right lateral lower eyelid 1.5 cm long full thickness laceration through orbicularis muscle, right medial canthal complex wound wiht stellate laceration to the skin approximately 3 cm x 3 cm in area, small bony fragment at the wound base, lacerated muscle, mild oozing, macerated skin, ecchymosis; right upper lip with jagged laceration through orbicularis oris muslce but not full thickness to mucosa, 3 cm long mucosal laceration to the right upper lip, not communicating with external lip wound        LABS:                        12.1   16.28 )-----------( 270      ( 31 May 2021 08:24 )             36.2         133<L>  |  103  |  7   ----------------------------<  138<H>  3.6   |  22  |  0.67    Ca    9.1      31 May 2021 08:24    TPro  7.6  /  Alb  3.9  /  TBili  0.3  /  DBili  x   /  AST  21  /  ALT  26  /  AlkPhos  70          EXAM:  CT MAXILLOFACIAL                          EXAM:  CT 3D RECONSTRUCT [facility]                          EXAM:  CT BRAIN                          EXAM:  CT CERVICAL SPINE                            PROCEDURE DATE:  2021          INTERPRETATION:  CT OF THE HEAD WITHOUT CONTRAST  CT CERVICAL SPINE WITHOUT CONTRAST  CT MAXILLOFACIAL WITHOUT CONTRAST    CLINICAL INDICATION: Fall. Head trauma.    TECHNIQUE: Volumetric CT acquisition was performed through the brain and reviewed using brain and bone window technique. Dose optimization techniques were utilized including kVp/mA modulation along with iterative reconstructions.  Thin section CT images were obtained through cervical spine with overlapping reconstructions.  Sagittal, coronal and bilateral oblique 2D reformats were then generated from the initial images. Dose reduction techniques were utilized including kVp/mA dose modulation based on patient size and iterative reconstruction.  Axial CT imaging was performed through the facial bones without the administration of intravenous contrast.. Coronal and sagittal reformatted images were also obtained. Dose optimization techniques were utilized including kVp/mA modulation along with iterative reconstructions.   3-D reconstructions of the facial bones were performed on a separate workstation and reviewed.    COMPARISON: CT head 2020    FINDINGS:  CT head:  Moderate right frontal scalp hematoma. There are a few punctate foci of subcutaneous emphysema which may correspond with an associated laceration.  The ventricular and sulcal size and configuration is age appropriate.   There is no acute loss of gray-white differentiation. Chronic right basal ganglia lacunar infarct.    There is no evidence of mass effect, midline shift, acute intracranial hemorrhage, or extra-axial collections.     The calvarium is intact.      CT cervical spine:    There is maintenance of usual cervical lordosis. There is no significant subluxation. Vertebral body height is preserved throughout the cervical spine. There is no significant loss of intervertebral disc height throughout the cervical spine.    There is no definite high-grade central canal stenosis.  The paravertebral soft tissues are unremarkable.    CT maxillofacial:  Displaced and comminuted fracture of the right maxillary nasal process. There is overlying right nasal soft tissue swelling and subcutaneous emphysema. There is also right facial and mild inferior medial right periorbital hematoma.    The bony orbits are intact. The mandible, maxilla, zygoma and pterygoid plates are intact.    The globes are symmetric and intact. There is no retrobulbar hematoma. The extraocular muscles and optic nerve sheath complexes are unremarkable.    There are no fluid levels within the paranasal sinuses. The visualized mastoid air cells are clear.      IMPRESSION:  CT HEAD: Moderate right frontal scalp hematoma with a few punctate foci of subcutaneous emphysema which may correspond with associated laceration. There is no acute intracranial hemorrhage or depressed calvarial fracture.    CT CERVICAL SPINE: No fracture or acute traumatic malalignment.    CT maxillofacial: Displaced and comminuted fracture of the right maxillary nasal process. There is associated overlying right nasal soft tissue swelling and subcutaneous emphysema. There is also right facial and mild inferior medial right periorbital hematoma. The bony orbits and globes are intact. No retrobulbar hematoma.            CHIP GE M.D., ATTENDING RADIOLOGIST  This document has been electronically signed. May 31 2021  9:27AM      A/P: Compound nasal bone fracture on the right with multiple soft tissue injuries to the face including the right forehead, right medial canthal region, right lower lateral eyelid and both internal and external right upper lip wounds.  -due to the syncompal episide and PVcs here in the ER, plan was for admission for further evaluation  -the facial lacerations have all been irrigated, debrided and closed by me  -the right medial canthal wound is complex in nature and close to the lacrimal duct, although not frankly disrupted, cannot rule out occult injury at the inferior aspect so Opthalmology/Occuloplastic consult was suggested. This service is apparently not available here for Emergency Room consultation at [facility] so the Trauma attending, Dr. Galvin initiated a  transfer to [facility] for Opthalmology evaluation.   -all wounds were repaired and dressed with Bactiracin. Telfa and paper tape. Erythromycin ophthalmic was applied to the medial canthal wound  -patient should follow up with me in one week for suture removal and follow up fo the nasal fracture  -plan was discussed with Dr. Galvin, Dr. Perez, patient and her   -patient should also receive PO Augmentin for wounds and ER started her on Doxycycline for her tick exposure

## 2021-05-31 NOTE — ED PROVIDER NOTE - CARE PLAN
Principal Discharge DX:	Head injury  Secondary Diagnosis:	Open fracture of nasal bone, initial encounter   Principal Discharge DX:	Syncope and collapse  Secondary Diagnosis:	Open fracture of nasal bone, initial encounter

## 2021-06-01 ENCOUNTER — TRANSCRIPTION ENCOUNTER (OUTPATIENT)
Age: 66
End: 2021-06-01

## 2021-06-01 VITALS
DIASTOLIC BLOOD PRESSURE: 70 MMHG | HEART RATE: 82 BPM | SYSTOLIC BLOOD PRESSURE: 127 MMHG | WEIGHT: 131.84 LBS | OXYGEN SATURATION: 98 % | RESPIRATION RATE: 18 BRPM | TEMPERATURE: 98 F

## 2021-06-01 LAB
ALBUMIN SERPL ELPH-MCNC: 3.7 G/DL — SIGNIFICANT CHANGE UP (ref 3.3–5)
ALP SERPL-CCNC: 55 U/L — SIGNIFICANT CHANGE UP (ref 40–120)
ALT FLD-CCNC: 14 U/L — SIGNIFICANT CHANGE UP (ref 10–45)
ANION GAP SERPL CALC-SCNC: 11 MMOL/L — SIGNIFICANT CHANGE UP (ref 5–17)
AST SERPL-CCNC: 18 U/L — SIGNIFICANT CHANGE UP (ref 10–40)
BILIRUB SERPL-MCNC: 0.3 MG/DL — SIGNIFICANT CHANGE UP (ref 0.2–1.2)
BUN SERPL-MCNC: 9 MG/DL — SIGNIFICANT CHANGE UP (ref 7–23)
CALCIUM SERPL-MCNC: 8.7 MG/DL — SIGNIFICANT CHANGE UP (ref 8.4–10.5)
CHLORIDE SERPL-SCNC: 101 MMOL/L — SIGNIFICANT CHANGE UP (ref 96–108)
CO2 SERPL-SCNC: 19 MMOL/L — LOW (ref 22–31)
COVID-19 SPIKE DOMAIN AB INTERP: POSITIVE
COVID-19 SPIKE DOMAIN ANTIBODY RESULT: >250 U/ML — HIGH
CREAT SERPL-MCNC: 0.55 MG/DL — SIGNIFICANT CHANGE UP (ref 0.5–1.3)
GLUCOSE SERPL-MCNC: 90 MG/DL — SIGNIFICANT CHANGE UP (ref 70–99)
HCT VFR BLD CALC: 29.9 % — LOW (ref 34.5–45)
HGB BLD-MCNC: 10.1 G/DL — LOW (ref 11.5–15.5)
MCHC RBC-ENTMCNC: 30.6 PG — SIGNIFICANT CHANGE UP (ref 27–34)
MCHC RBC-ENTMCNC: 33.8 GM/DL — SIGNIFICANT CHANGE UP (ref 32–36)
MCV RBC AUTO: 90.6 FL — SIGNIFICANT CHANGE UP (ref 80–100)
NRBC # BLD: 0 /100 WBCS — SIGNIFICANT CHANGE UP (ref 0–0)
PLATELET # BLD AUTO: 225 K/UL — SIGNIFICANT CHANGE UP (ref 150–400)
POTASSIUM SERPL-MCNC: 4.1 MMOL/L — SIGNIFICANT CHANGE UP (ref 3.5–5.3)
POTASSIUM SERPL-SCNC: 4.1 MMOL/L — SIGNIFICANT CHANGE UP (ref 3.5–5.3)
PROT SERPL-MCNC: 6.5 G/DL — SIGNIFICANT CHANGE UP (ref 6–8.3)
RBC # BLD: 3.3 M/UL — LOW (ref 3.8–5.2)
RBC # FLD: 12.8 % — SIGNIFICANT CHANGE UP (ref 10.3–14.5)
SARS-COV-2 IGG+IGM SERPL QL IA: >250 U/ML — HIGH
SARS-COV-2 IGG+IGM SERPL QL IA: POSITIVE
SODIUM SERPL-SCNC: 131 MMOL/L — LOW (ref 135–145)
WBC # BLD: 6.52 K/UL — SIGNIFICANT CHANGE UP (ref 3.8–10.5)
WBC # FLD AUTO: 6.52 K/UL — SIGNIFICANT CHANGE UP (ref 3.8–10.5)

## 2021-06-01 PROCEDURE — 93005 ELECTROCARDIOGRAM TRACING: CPT

## 2021-06-01 PROCEDURE — 93880 EXTRACRANIAL BILAT STUDY: CPT | Mod: 26

## 2021-06-01 PROCEDURE — 99239 HOSP IP/OBS DSCHRG MGMT >30: CPT

## 2021-06-01 PROCEDURE — 93010 ELECTROCARDIOGRAM REPORT: CPT

## 2021-06-01 PROCEDURE — 73030 X-RAY EXAM OF SHOULDER: CPT

## 2021-06-01 PROCEDURE — 80053 COMPREHEN METABOLIC PANEL: CPT

## 2021-06-01 PROCEDURE — 85027 COMPLETE CBC AUTOMATED: CPT

## 2021-06-01 PROCEDURE — 84100 ASSAY OF PHOSPHORUS: CPT

## 2021-06-01 PROCEDURE — 85025 COMPLETE CBC W/AUTO DIFF WBC: CPT

## 2021-06-01 PROCEDURE — 93306 TTE W/DOPPLER COMPLETE: CPT | Mod: 26

## 2021-06-01 PROCEDURE — 96374 THER/PROPH/DIAG INJ IV PUSH: CPT

## 2021-06-01 PROCEDURE — 99285 EMERGENCY DEPT VISIT HI MDM: CPT | Mod: 25

## 2021-06-01 PROCEDURE — 93880 EXTRACRANIAL BILAT STUDY: CPT

## 2021-06-01 PROCEDURE — 72170 X-RAY EXAM OF PELVIS: CPT

## 2021-06-01 PROCEDURE — 73110 X-RAY EXAM OF WRIST: CPT

## 2021-06-01 PROCEDURE — 71045 X-RAY EXAM CHEST 1 VIEW: CPT

## 2021-06-01 PROCEDURE — 83735 ASSAY OF MAGNESIUM: CPT

## 2021-06-01 PROCEDURE — 93306 TTE W/DOPPLER COMPLETE: CPT

## 2021-06-01 PROCEDURE — 86769 SARS-COV-2 COVID-19 ANTIBODY: CPT

## 2021-06-01 RX ORDER — KETOROLAC TROMETHAMINE 30 MG/ML
15 SYRINGE (ML) INJECTION ONCE
Refills: 0 | Status: DISCONTINUED | OUTPATIENT
Start: 2021-06-01 | End: 2021-06-01

## 2021-06-01 RX ORDER — ACETAMINOPHEN 500 MG
2 TABLET ORAL
Qty: 0 | Refills: 0 | DISCHARGE
Start: 2021-06-01

## 2021-06-01 RX ORDER — BACITRACIN ZINC 500 UNIT/G
1 OINTMENT IN PACKET (EA) TOPICAL
Qty: 60 | Refills: 0
Start: 2021-06-01 | End: 2021-06-14

## 2021-06-01 RX ORDER — ERYTHROMYCIN BASE 5 MG/GRAM
1 OINTMENT (GRAM) OPHTHALMIC (EYE)
Qty: 30 | Refills: 0
Start: 2021-06-01 | End: 2021-06-14

## 2021-06-01 RX ADMIN — Medication 1 APPLICATION(S): at 14:31

## 2021-06-01 RX ADMIN — Medication 1 APPLICATION(S): at 05:19

## 2021-06-01 RX ADMIN — Medication 650 MILLIGRAM(S): at 02:17

## 2021-06-01 RX ADMIN — SODIUM CHLORIDE 3 MILLILITER(S): 9 INJECTION INTRAMUSCULAR; INTRAVENOUS; SUBCUTANEOUS at 13:51

## 2021-06-01 RX ADMIN — Medication 650 MILLIGRAM(S): at 02:52

## 2021-06-01 RX ADMIN — Medication 1 TABLET(S): at 05:33

## 2021-06-01 RX ADMIN — Medication 15 MILLIGRAM(S): at 08:39

## 2021-06-01 RX ADMIN — SODIUM CHLORIDE 3 MILLILITER(S): 9 INJECTION INTRAMUSCULAR; INTRAVENOUS; SUBCUTANEOUS at 05:20

## 2021-06-01 RX ADMIN — Medication 15 MILLIGRAM(S): at 09:09

## 2021-06-01 NOTE — DISCHARGE NOTE PROVIDER - NSDCFUADDAPPT_GEN_ALL_CORE_FT
-patient should follow up Dr Nobles plastic surgery  in one week for suture removal and follow up fo the nasal fracture    Outpatient Follow-up: Patient should follow-up with his/her ophthalmologist or with [facility] Department of Ophthalmology within 1 week of after discharge at:    [address]  [place] [address]  [phone]       1. patient should follow up Dr. Nobles plastic surgery in one week for suture removal and follow up fo the nasal fracture    2. Outpatient Follow-up: Patient should follow-up with his/her ophthalmologist or with [facility] Department of Ophthalmology within 1 week of after discharge at:    [address] [place] [address]  [phone]    3. Please schedule an appointment with your Cardiologist Dr. Membreno in 1 week, please call the office to schedule an appointment.        1. patient should follow up Dr. Nobles plastic surgery in one week for suture removal and follow up fo the nasal fracture    2. Outpatient Follow-up: Patient should follow-up with his/her ophthalmologist or with [facility] Department of Ophthalmology within 1 week of after discharge at:    [address] [address]  [phone]    3. Please schedule an appointment with your Cardiologist Dr. Membreno in 1 week, please call the office to schedule an appointment.     4. Please schedule an appointment with your PCP in 1-2 weeks, please call the office to schedule an appointment.

## 2021-06-01 NOTE — DISCHARGE NOTE PROVIDER - NSDCPNSUBOBJ_GEN_ALL_CORE
Subjective:     Tele:                                T(C): 36.7 (06-01-21 @ 10:52), Max: 37.6 (05-31-21 @ 20:06)  HR: 82 (06-01-21 @ 10:52) (82 - 103)  BP: 127/70 (06-01-21 @ 10:52) (127/65 - 146/80)  RR: 18 (06-01-21 @ 10:52) (16 - 18)  SpO2: 98% (06-01-21 @ 10:52) (98% - 100%)        06-01    131<L>  |  101  |  9   ----------------------------<  90  4.1   |  19<L>  |  0.55    Ca    8.7      01 Jun 2021 06:00  Phos  3.4     05-31  Mg     1.9     05-31    TPro  6.5  /  Alb  3.7  /  TBili  0.3  /  DBili  x   /  AST  18  /  ALT  14  /  AlkPhos  55  06-01                               10.1   6.52  )-----------( 225      ( 01 Jun 2021 06:00 )             29.9              Assessment    Neuro:     Pulm:     CV:    Abd:     Extremities:       MEDICATIONS  (STANDING):  amoxicillin  875 milliGRAM(s)/clavulanate 1 Tablet(s) Oral two times a day  BACItracin   Ointment 1 Application(s) Topical three times a day  doxycycline hyclate Capsule 100 milliGRAM(s) Oral every 12 hours  erythromycin   Ointment 1 Application(s) Right EYE two times a day  sodium chloride 0.9% lock flush 3 milliLiter(s) IV Push every 8 hours       PAST MEDICAL & SURGICAL HISTORY:  No pertinent past medical history    No significant past surgical history               VITAL SIGNS    Subjective: Denies CP, palpitation, SOB, RAMOS, HA, dizziness, N/V/D, fever or chills.  No acute event noted overnight.    Telemetry:  NSR      Vital Signs Last 24 Hrs  T(C): 36.7 (21 @ 10:52), Max: 37.6 (21 @ 20:06)  T(F): 98.1 (21 @ 10:52), Max: 99.7 (21 @ 20:06)  HR: 82 (21 @ 10:52) (82 - 103)  BP: 127/70 (21 @ 10:52) (127/70 - 134/66)  RR: 18 (21 @ 10:52) (16 - 18)  SpO2: 98% (21 @ 10:52) (98% - 100%)            @ 07:01  -   @ 07:00  --------------------------------------------------------  IN: 150 mL / OUT: 0 mL / NET: 150 mL     @ 07:01  -   @ 15:06  --------------------------------------------------------  IN: 440 mL / OUT: 0 mL / NET: 440 mL    Daily     Daily Weight in k.8 (2021 10:52)    PHYSICAL EXAM    Neurology: alert and oriented x 3, nonfocal, no gross deficits    HEENT: Right periorbital laceration with suture --> C/D/I.  Ecchymotic     CV: (+) S1 and S2, No murmurs, rubs, gallops or clicks     Lungs: CTA B/L     Abdomen: soft, nontender, nondistended, positive bowel sounds, (+) Flatus; (+) BM     :  Voiding               Extremities:  B/L LE (-) edema; negative calf tenderness; (+) 2 DP palpable        acetaminophen   Tablet .. 650 milliGRAM(s) Oral every 6 hours PRN  amoxicillin  875 milliGRAM(s)/clavulanate 1 Tablet(s) Oral two times a day  BACItracin   Ointment 1 Application(s) Topical three times a day  doxycycline hyclate Capsule 100 milliGRAM(s) Oral every 12 hours  erythromycin   Ointment 1 Application(s) Right EYE two times a day  sodium chloride 0.9% lock flush 3 milliLiter(s) IV Push every 8 hours    Discussed with Cardiothoracic Team at AM rounds.    Spent 35 min face to face encounter with patient and discharge note.

## 2021-06-01 NOTE — DISCHARGE NOTE PROVIDER - NSDCFUADDINST_GEN_ALL_CORE_FT
follow up with plastic surgery/ ophthalmology regarding facial fracture    Outpatient Follow-up: Patient should follow-up with his/her ophthalmologist or with [facility] Department of Ophthalmology within 1 week of after discharge at:    [address]  [place] [address]  [phone]

## 2021-06-01 NOTE — DISCHARGE NOTE NURSING/CASE MANAGEMENT/SOCIAL WORK - PATIENT PORTAL LINK FT
You can access the FollowMyHealth Patient Portal offered by [facility] by registering at the following website: [email or web address]. By joining [facility]’s FollowMyHealth portal, you will also be able to view your health information using other applications (apps) compatible with our system.

## 2021-06-01 NOTE — CHART NOTE - NSCHARTNOTEFT_GEN_A_CORE
TERTIARY TRAUMA SURVEY  ------------------------------------------------------------------------------------    Date of TTS: 61/1/21  Time: 9am  Admit Date: 5/31/21    Trauma Activation: Consult    HPI:  64 y/o F with no PMH, past ocular history LASIK both eyes (10 years ago) who presented as transfer from [facility] 2/2 passing out after stepping out of the shower and hit the right aspect of her face. Patient does not remember any other details.  found patient with several facial lacerations. Patient denies history of syncope in the past. Pt reports right periorbital pain (8/10 in severity) but denies any right eye pain. No visual changes. CTH revealed Compound nasal bone fracture on the right with multiple soft tissue injuries to the face including the right forehead, right medial canthal region, right lower lateral eyelid and both internal and external right upper lip wounds. The facial lacerations have all been irrigated, debrided and closed by plastic surgery. Right medial canthal wound is complex in nature and close to the lacrimal duct, although not frankly disrupted, cannot rule out occult injury at the inferior aspect so Opthalmology/Occuloplastic consult was suggested. All wounds were repaired and dressed with Bacitracin, Telfa and paper tape. Erythromycin ophthalmic was applied to the medial canthal wound. Due to the syncopal episode and PVCs in ED, plan was for admission for further evaluation.  (31 May 2021 16:27)      INTERVAL EVENTS:     --Continue to have right arm pain.       PAST MEDICAL & SURGICAL HISTORY:  No pertinent past medical history    No significant past surgical history      [] No significant past history as reviewed with the patient and family    FAMILY HISTORY:    [] Family history not pertinent as reviewed with the patient and family    ALLERGIES: No Known Allergies      HOME MEDICATIONS:     CURRENT MEDICATIONS  MEDICATIONS (STANDING): amoxicillin  875 milliGRAM(s)/clavulanate 1 Tablet(s) Oral two times a day  doxycycline hyclate Capsule 100 milliGRAM(s) Oral every 12 hours  sodium chloride 0.9% lock flush 3 milliLiter(s) IV Push every 8 hours    MEDICATIONS (PRN):acetaminophen   Tablet .. 650 milliGRAM(s) Oral every 6 hours PRN Mild Pain (1 - 3)    -----------------------------------------------------------------------------------    VITAL SIGNS:  T(C): 36.7 (06-01-21 @ 10:52), Max: 37.6 (05-31-21 @ 20:06)  HR: 82 (06-01-21 @ 10:52) (82 - 103)  BP: 127/70 (06-01-21 @ 10:52) (127/65 - 146/80)  RR: 18 (06-01-21 @ 10:52) (15 - 18)  SpO2: 98% (06-01-21 @ 10:52) (98% - 100%)  CAPILLARY BLOOD GLUCOSE        Drug Dosing Weight  Height (cm): 322.6 (31 May 2021 14:11)  Weight (kg): 68 (31 May 2021 08:00)  BMI (kg/m2): 6.5 (31 May 2021 14:11)  BSA (m2): 2.84 (31 May 2021 14:11)    05-31 @ 07:01  -  06-01 @ 07:00  --------------------------------------------------------  IN:    Oral Fluid: 150 mL  Total IN: 150 mL    OUT:  Total OUT: 0 mL    Total NET: 150 mL      06-01 @ 07:01  -  06-01 @ 12:06  --------------------------------------------------------  IN:    Oral Fluid: 240 mL  Total IN: 240 mL    OUT:  Total OUT: 0 mL    Total NET: 240 mL          PHYSICAL EXAM:    General: NAD, Sitting in bed comfortably, not irritable   HEENT: NC/AT, EOMI. swelling over right forehead w/ dried blood.   Neck: Soft, supple  Cardio: Regular rate   Resp: Good effort, NWB  Thorax: No chest wall tenderness  GI/Abd: Soft, NT/ND, no rebound/guarding, no masses palpated  Vascular: Extremities warm, brisk cap refill, B/l radial pulses palpable  Skin: Intact, no breakdown  Lymphatic/Nodes: No palpable lymphadenopathy  Musculoskeletal: All 4 extremities moving spontaneously, no limitations. No tenderness to palpation of RUE, LUE, LLE, RLE. Full ROM.     LABS:  CBC (06-01 @ 06:00)                              10.1<L>                         6.52    )----------------(  225        --    % Neutrophils, --    % Lymphocytes, ANC: --                                  29.9<L>  CBC (05-31 @ 16:35)                              10.5<L>                         9.90    )----------------(  221        87.7<H>% Neutrophils, 6.6<L>% Lymphocytes, ANC: 8.69<H>                              31.4<L>    BMP (06-01 @ 06:00)             131<L>  |  101     |  9     		Ca++ --      Ca 8.7                ---------------------------------( 90    		Mg --                 4.1     |  19<L>   |  0.55  			Ph --      BMP (05-31 @ 16:35)             132<L>  |  102     |  6<L>  		Ca++ --      Ca 8.6                ---------------------------------( 117<H>		Mg 1.9                4.1     |  18<L>   |  0.54  			Ph 3.4       LFTs (06-01 @ 06:00)      TPro 6.5 / Alb 3.7 / TBili 0.3 / DBili -- / AST 18 / ALT 14 / AlkPhos 55  LFTs (05-31 @ 16:35)      TPro 6.6 / Alb 3.8 / TBili 0.3 / DBili -- / AST 20 / ALT 16 / AlkPhos 62      Cardiac Markers (05-31 @ 08:24)     HSTrop: -- / CKMB: -- / CK: 152          MICROBIOLOGY:        ------------------------------------------------------------------------------------------  RADIOLOGICAL FINDINGS REVIEW:   < from: Xray Shoulder 2 Views, Right (05.31.21 @ 19:02) >      EXAM:  XR SHOULDER COMP MIN 2V RT                            PROCEDURE DATE:  05/31/2021            INTERPRETATION:  CLINICAL INDICATION: right shoulder pain status post fall    EXAM:  Internal and external rotation AP and transscapular Y right shoulder from 5/31/2021 at 1902. No similar prior studies available for comparison.    IMPRESSION:  No fractures, dislocations, or AC separation.    Preserved joint spaces and smooth articular margins.    Maintained subacromial and coracoclavicular spaces.    No destructive osseous lesions or periosteal reaction.    No periarticular soft tissue calcifications.                RAJIV SUERO M.D., ATTENDING RADIOLOGIST  This document has been electronically signed. Jun 1 2021  9:36AM    < end of copied text >    < from: Xray Pelvis AP only (05.31.21 @ 19:01) >      EXAM:  PELVIS AP ONLY                            PROCEDURE DATE:  05/31/2021            INTERPRETATION:  CLINICAL INDICATION: fall; pelvic and hip pain    EXAM:  AP pelvis and hips from 5/31/2021 at 1901. No similar prior studies available for comparison.    IMPRESSION:  No hip fractures or dislocations.    Intact pelvic and obturator rings and symmetrically aligned and spaced SI joints and pubic symphysis.    Preserved bilateral hip joint spaces. No gross radiographic evidence for AVN.    No destructive osseous lesions or periosteal reaction.                RAJIV SUERO M.D., ATTENDING RADIOLOGIST  This document has been electronically signed. Jun 1 2021  9:35AM    < end of copied text >    < from: Xray Chest 1 View AP/PA (05.31.21 @ 19:01) >      EXAM:  XR CHEST AP OR PA 1V                            PROCEDURE DATE:  05/31/2021            INTERPRETATION:  TECHNIQUE: A single AP view of the chest was obtained. Ordered time:   5/31/2021 7:01 PM    COMPARISON: Same day at 9:01 AM    CLINICAL INFORMATION: Fall    FINDINGS:    The heart size is not well assessed on AP film.  The lungs are clear.  There are no pleural effusions. There is no pneumothorax.  There are mild degenerative changes and scoliosis seen in the thoracic spine.    IMPRESSION:    No acute pulmonary disease. No significant change              CHERELLE WALLACE M.D., ATTENDING RADIOLOGIST  This document has been electronically signed.  CHERELLE WALLACE M.D., ATTENDING RADIOLOGIST  This document has been electronically signed. Jun 1 2021  8:53AM    < end of copied text >    < from: Xray Wrist 3 Views, Right (05.31.21 @ 19:00) >      EXAM:  XR WRIST COMP MIN 3 VIEWS RT                            PROCEDURE DATE:  05/31/2021            INTERPRETATION:  CLINICAL INDICATION: right wrist pain    EXAM:  Frontal, oblique, and lateral right wrist from 5/31/2021 at 1900. Compared to prior study from 12/13/2020.    IMPRESSION:  No dislocations or acute appearing fractures.    Preserved joint spaces and no joint margin erosions.    Carpal bones normally aligned.    Positive appearing ulnar variance with appearance of vague abutment changes along proximal ulnar margin of the opposing lunate bone.    No lytic or blastic lesions.                RAJIV SUERO M.D., ATTENDING RADIOLOGIST  This document has been electronically signed. Jun 1 2021  9:34AM    < end of copied text >    < from: CT Maxillofacial No Cont (05.31.21 @ 09:04) >      EXAM:  CT MAXILLOFACIAL                          [facility]  [facility]                          [facility]  CT B[facility]                          EXAM:  CT CERVICAL SPINE                            PROCEDURE DATE:  05/31/2021          INTERPRETATION:  CT OF THE HEAD WITHOUT CONTRAST  CT CERVICAL SPINE WITHOUT CONTRAST  CT MAXILLOFACIAL WITHOUT CONTRAST    CLINICAL INDICATION: Fall. Head trauma.    TECHNIQUE: Volumetric CT acquisition was performed through the brain and reviewed using brain and bone window technique. Dose optimization techniques were utilized including kVp/mA modulation along with iterative reconstructions.  Thin section CT images were obtained through cervical spine with overlapping reconstructions.  Sagittal, coronal and bilateral oblique 2D reformats were then generated from the initial images. Dose reduction techniques were utilized including kVp/mA dose modulation based on patient size and iterative reconstruction.  Axial CT imaging was performed through the facial bones without the administration of intravenous contrast.. Coronal and sagittal reformatted images were also obtained. Dose optimization techniques were utilized including kVp/mA modulation along with iterative reconstructions.   3-D reconstructions of the facial bones were performed on a separate workstation and reviewed.    COMPARISON: CT head 12/13/2020    FINDINGS:  CT head:  Moderate right frontal scalp hematoma. There are a few punctate foci of subcutaneous emphysema which may correspond with an associated laceration.  The ventricular and sulcal size and configuration is age appropriate.   There is no acute loss of gray-white differentiation. Chronic right basal ganglia lacunar infarct.    There is no evidence of mass effect, midline shift, acute intracranial hemorrhage, or extra-axial collections.     The calvarium is intact.      CT cervical spine:    There is maintenance of usual cervical lordosis. There is no significant subluxation. Vertebral body height is preserved throughout the cervical spine. There is no significant loss of intervertebral disc height throughout the cervical spine.    There is no definite high-grade central canal stenosis.  The paravertebral soft tissues are unremarkable.    CT maxillofacial:  Displaced and comminuted fracture of the right maxillary nasal process. There is overlying right nasal soft tissue swelling and subcutaneous emphysema. There is also right facial and mild inferior medial right periorbital hematoma.    The bony orbits areintact. The mandible, maxilla, zygoma and pterygoid plates are intact.    The globes are symmetric and intact. There is no retrobulbar hematoma. The extraocular muscles and optic nerve sheath complexes are unremarkable.    There are no fluid levels within the paranasal sinuses. The visualized mastoid air cells are clear.      IMPRESSION:  CT HEAD: Moderate right frontal scalp hematoma with a few punctate foci of subcutaneous emphysema which may correspond with associated laceration. There is no acute intracranial hemorrhage or depressed calvarial fracture.    CT CERVICAL SPINE: No fracture or acute traumatic malalignment.    CT maxillofacial: Displaced and comminuted fracture of the right maxillary nasal process. There is associated overlyingright nasal soft tissue swelling and subcutaneous emphysema. There is also right facial and mild inferior medial right periorbital hematoma. The bony orbits and globes are intact. No retrobulbar hematoma.            CHIP GE M.D., ATTENDING RADIOLOGIST  This document has been electronically signed. May 31 2021  9:27AM    < end of copied text >    < from: CT Cervical Spine No Cont (05.31.21 @ 09:01) >      EXAM:  CT MAXILLOFACIAL                          EXAM:  CT 3D RECONSTRUCT ISRA LEBRON                          EXAM:  CT BRAIN                          EXAM:  CT CERVICAL SPINE                            PROCEDURE DATE:  05/31/2021          INTERPRETATION:  CT OF THE HEAD WITHOUT CONTRAST  CT CERVICAL SPINE WITHOUT CONTRAST  CT MAXILLOFACIAL WITHOUT CONTRAST    CLINICAL INDICATION: Fall. Head trauma.    TECHNIQUE: Volumetric CT acquisition was performed through the brain and reviewed using brain and bone window technique. Dose optimization techniques were utilized including kVp/mA modulation along with iterative reconstructions.  Thin section CT images were obtained through cervical spine with overlapping reconstructions.  Sagittal, coronal and bilateral oblique 2D reformats were then generated from the initial images. Dose reduction techniques were utilized including kVp/mA dose modulation based on patient size and iterative reconstruction.  Axial CT imaging was performed through the facial bones without the administration of intravenous contrast.. Coronal and sagittal reformatted images were also obtained. Dose optimization techniques were utilized including kVp/mA modulation along with iterative reconstructions.   3-D reconstructions of the facial bones were performed on a separate workstation and reviewed.    COMPARISON: CT head 12/13/2020    FINDINGS:  CT head:  Moderate right frontal scalp hematoma. There are a few punctate foci of subcutaneous emphysema which may correspond with an associated laceration.  The ventricular and sulcal size and configuration is age appropriate.   There is no acute loss of gray-white differentiation. Chronic right basal ganglia lacunar infarct.    There is no evidence of mass effect, midline shift, acute intracranial hemorrhage, or extra-axial collections.     The calvarium is intact.      CT cervical spine:    There is maintenance of usual cervical lordosis. There is no significant subluxation. Vertebral body height is preserved throughout the cervical spine. There is no significant loss of intervertebral disc height throughout the cervical spine.    There is no definite high-grade central canal stenosis.  The paravertebral soft tissues are unremarkable.    CT maxillofacial:  Displaced and comminuted fracture of the right maxillary nasal process. There is overlying right nasal soft tissue swelling and subcutaneous emphysema. There is also right facial and mild inferior medial right periorbital hematoma.    The bony orbits areintact. The mandible, maxilla, zygoma and pterygoid plates are intact.    The globes are symmetric and intact. There is no retrobulbar hematoma. The extraocular muscles and optic nerve sheath complexes are unremarkable.    There are no fluid levels within the paranasal sinuses. The visualized mastoid air cells are clear.      IMPRESSION:  CT HEAD: Moderate right frontal scalp hematoma with a few punctate foci of subcutaneous emphysema which may correspond with associated laceration. There is no acute intracranial hemorrhage or depressed calvarial fracture.    CT CERVICAL SPINE: No fracture or acute traumatic malalignment.    CT maxillofacial: Displaced and comminuted fracture of the right maxillary nasal process. There is associated overlyingright nasal soft tissue swelling and subcutaneous emphysema. There is also right facial and mild inferior medial right periorbital hematoma. The bony orbits and globes are intact. No retrobulbar hematoma.            CHIP GE M.D., ATTENDING RADIOLOGIST  This document has been electronically signed. May 31 2021  9:27AM    < end of copied text >    < from: CT Head No Cont (05.31.21 @ 08:59) >        < end of copied text >          List Injuries Identified to Date:    Open fracture of nasal bone, initial encounter      PVC (premature ventricular contraction)  PVC (premature ventricular contraction)      INTERPRETATION/ASSESSMENT:   65F with no PMH, remote history of fall 2/2 vasovagal ~20 years ago, presents after fall, found to have R frontal scalp hematoma, displaced fracture of R maxillary nasal process with periorbital hematoma. No new injuries noted on tertiary survey.   PLAN:   - No fractures/injuries noted on Xray of right shoulder and right wrist. Can c/w symptomatic management.   - No injuries noted on tertiary survey. Continue with management of existing injuries per optho.   - Dispo/further evaluation of syncope per primary team

## 2021-06-01 NOTE — DISCHARGE NOTE PROVIDER - HOSPITAL COURSE
65F with no PMH, remote history of fall 2/2 vasovagal ~20 years ago, presents after fall, found to have R frontal scalp hematoma, displaced fracture of R maxillary nasal process with periorbital hematoma  Transferred from  for opthamology exam,further syncope workup  Echo < from: Transthoracic Echocardiogram (06.01.21 @ 10:07) >    Conclusions:  1. Normal mitral valve. Mild mitral regurgitation.  2. Normal trileaflet aortic valve. Minimal aortic  regurgitation.  3. Normal left ventricular systolic function. No segmental  wall motion abnormalities.  4. The right ventricle is not well visualized on axis;  normal right ventricular systolic function. Grossly, right  ventricle appears mildly enlarged; RV may appear enlarged  due to off-axis imaging.    < end of copied text >    Carotids< from: VA Duplex Carotid, Bilat (06.01.21 @ 11:39) >    IMPRESSION: No significant hemodynamic stenosis of either carotid artery.    < end of copied text >       65F with no PMH, remote history of fall 2/2 vasovagal ~20 years ago, presents after fall, found to have R frontal scalp hematoma, displaced fracture of R maxillary nasal process with periorbital hematoma.  Laceration medial to medial canthus repaired by plastics. Transferred from  for opthalmology exam and further syncope workup.     Hospital Course:   5/31 Ophthalmology consult called and appreciated consulted for laceration medial to R medial canthus. Patient probed and irrigated with canalicular probe and canalicular system patent indicating no canalicular damage.   6/1 Transthoracic Echocardiogram revealed: Normal mitral valve. Mild mitral regurgitation. Normal trileaflet aortic valve. Minimal aortic regurgitation. Normal left ventricular systolic function. No segmental  wall motion abnormalities. The right ventricle is not well visualized on axis; normal right ventricular systolic function. Grossly, right ventricle appears mildly enlarged; RV may appear enlarged  due to off-axis imaging. Carotids Duplex Study revealed No significant hemodynamic stenosis of either carotid artery.  Trauma surgery following, no further intervention warranted at this time.  Patient will follow up outpatient with Dr. Sam Membreno.  OT placed for continuous cardiac monitoring per Dr. Hampton.  Patient cleared for discharge home.         65F with no PMH, remote history of fall 2/2 vasovagal ~20 years ago, presents after fall, found to have R frontal scalp hematoma, displaced fracture of R maxillary nasal process with periorbital hematoma.  Laceration medial to medial canthus repaired by plastics. Transferred from  for opthalmology exam and further syncope workup.     Hospital Course:   5/31 Ophthalmology consult called and appreciated consulted for laceration medial to R medial canthus. Patient probed and irrigated with canalicular probe and canalicular system patent indicating no canalicular damage.   6/1 Transthoracic Echocardiogram revealed: Normal mitral valve. Mild mitral regurgitation. Normal trileaflet aortic valve. Minimal aortic regurgitation. Normal left ventricular systolic function. No segmental wall motion abnormalities. The right ventricle is not well visualized on axis; normal right ventricular systolic function. Grossly, right ventricle appears mildly enlarged; RV may appear enlarged due to off-axis imaging. Carotids Duplex Study revealed No significant hemodynamic stenosis of either carotid artery.  Trauma surgery following, no further intervention warranted at this time.  Patient will follow up outpatient with Dr. Sam Membreno.  OT placed for continuous cardiac monitoring per Dr. Hampton.  Patient cleared for discharge home.

## 2021-06-01 NOTE — PROGRESS NOTE ADULT - SUBJECTIVE AND OBJECTIVE BOX
GENERAL SURGERY DAILY PROGRESS NOTE:         24 hr events:      Objective:    Vital Signs Last 24 Hrs  T(C): 36.9 (01 Jun 2021 05:07), Max: 37.6 (31 May 2021 20:06)  T(F): 98.5 (01 Jun 2021 05:07), Max: 99.7 (31 May 2021 20:06)  HR: 84 (01 Jun 2021 05:07) (84 - 103)  BP: 128/72 (01 Jun 2021 05:07) (95/55 - 146/80)  BP(mean): 91 (01 Jun 2021 05:07) (65 - 91)  RR: 18 (01 Jun 2021 05:07) (15 - 20)  SpO2: 98% (01 Jun 2021 05:07) (98% - 100%)    I&O's Detail    31 May 2021 07:01  -  01 Jun 2021 05:30  --------------------------------------------------------  IN:    Oral Fluid: 50 mL  Total IN: 50 mL    OUT:  Total OUT: 0 mL    Total NET: 50 mL          Physical Exam:    General: NAD, well-nourished  HEENT: Atraumatic, EOMI  Resp: Breathing comfortably on RA  CV: regular rate, no edema.   Abd: soft, NT/ND   Ext: ROMIx4, motor strength intact x 4  Psych: AOx3  Neuro: No focal deficits       Laboratory Results:                          10.5   9.90  )-----------( 221      ( 31 May 2021 16:35 )             31.4     05-31    132<L>  |  102  |  6<L>  ----------------------------<  117<H>  4.1   |  18<L>  |  0.54    Ca    8.6      31 May 2021 16:35  Phos  3.4     05-31  Mg     1.9     05-31    TPro  6.6  /  Alb  3.8  /  TBili  0.3  /  DBili  x   /  AST  20  /  ALT  16  /  AlkPhos  62  05-31

## 2021-06-01 NOTE — DISCHARGE NOTE PROVIDER - NSDCCPCAREPLAN_GEN_ALL_CORE_FT
PRINCIPAL DISCHARGE DIAGNOSIS  Diagnosis: Syncope and collapse  Assessment and Plan of Treatment: [facility] for discharge  Follow up Dr Membreno> office will contact you      SECONDARY DISCHARGE DIAGNOSES  Diagnosis: Open fracture of nasal bone, initial encounter  Assessment and Plan of Treatment: Open fracture of nasal bone, initial encounter     PRINCIPAL DISCHARGE DIAGNOSIS  Diagnosis: Syncope and collapse  Assessment and Plan of Treatment:   1. [facility] for discharge  2. Follow up with outpatient ccardiology Dr. Membreno's office will contact you to schedule appointment in 1 week.   3. Continue on current medication regimen as instructed on your discharge paperwork.         SECONDARY DISCHARGE DIAGNOSES  Diagnosis: Open fracture of nasal bone, initial encounter  Assessment and Plan of Treatment:   1. Open fracture of nasal bone, initial encounter  2. Follow up with Plastics in 1 week for suture removal.

## 2021-06-01 NOTE — DISCHARGE NOTE PROVIDER - CARE PROVIDER_API CALL
Sam Membreno)  Cardiovascular Disease; Internal Medicine  [address] [place], [place] [id number]  Phone: [phone]  Fax: [phone]  Follow Up Time: 1 week    Judy Nobles)  Plastic Surgery; Surgery  [address] [address]  Phone: [phone]  Fax: [phone]  Follow Up Time: 1 week

## 2021-06-01 NOTE — DISCHARGE NOTE PROVIDER - NSDCMRMEDTOKEN_GEN_ALL_CORE_FT
acetaminophen 325 mg oral tablet: 2 tab(s) orally every 6 hours, As needed, Mild Pain (1 - 3)  amoxicillin-clavulanate 875 mg-125 mg oral tablet: 1 tab(s) orally 2 times a day  bacitracin 500 units/g topical ointment: 1 application topically 3 times a day  erythromycin 0.5% ophthalmic ointment: 1 application to each affected eye 2 times a day

## 2021-06-01 NOTE — PROGRESS NOTE ADULT - ASSESSMENT
65F with no PMH, remote history of fall 2/2 vasovagal ~20 years ago, presents after fall, found to have R frontal scalp hematoma, displaced fracture of R maxillary nasal process with periorbital hematoma    PLAN:   - No acute surgical intervention  - obtain Xrays of right shoulder, hand, wrist, chest, and pelvis   - recommend holding anticoagulation/ DVT ppx until xrays  - follow up with plastic surgery/ ophthalmology regarding facial fracture  - syncope workup  - Tertiary survey today    Trauma surgery  [id number]

## 2021-06-01 NOTE — DISCHARGE NOTE PROVIDER - PROVIDER TOKENS
PROVIDER:[TOKEN:[[id number]],FOLLOWUP:[1 week]],PROVIDER:[TOKEN:[[id number]],FOLLOWUP:[1 week]]

## 2021-06-03 DIAGNOSIS — S01.81XA LACERATION WITHOUT FOREIGN BODY OF OTHER PART OF HEAD, INITIAL ENCOUNTER: ICD-10-CM

## 2021-06-03 DIAGNOSIS — Z82.49 FAMILY HISTORY OF ISCHEMIC HEART DISEASE AND OTHER DISEASES OF THE CIRCULATORY SYSTEM: ICD-10-CM

## 2021-06-03 DIAGNOSIS — I63.89 OTHER CEREBRAL INFARCTION: ICD-10-CM

## 2021-06-03 DIAGNOSIS — I49.3 VENTRICULAR PREMATURE DEPOLARIZATION: ICD-10-CM
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
## 2021-06-03 DIAGNOSIS — S02.40CA MAXILLARY FRACTURE, RIGHT SIDE, INITIAL ENCOUNTER FOR CLOSED FRACTURE: ICD-10-CM

## 2021-06-03 DIAGNOSIS — Z90.710 ACQUIRED ABSENCE OF BOTH CERVIX AND UTERUS: ICD-10-CM

## 2021-06-03 DIAGNOSIS — S05.8X1A OTHER INJURIES OF RIGHT EYE AND ORBIT, INITIAL ENCOUNTER: ICD-10-CM

## 2021-06-03 DIAGNOSIS — T79.7XXA TRAUMATIC SUBCUTANEOUS EMPHYSEMA, INITIAL ENCOUNTER: ICD-10-CM

## 2021-06-03 DIAGNOSIS — R55 SYNCOPE AND COLLAPSE: ICD-10-CM
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
## 2021-06-03 LAB
A PHAGOCYTOPH IGG TITR SER IF: SIGNIFICANT CHANGE UP TITER
B BURGDOR AB SER QL IA: NEGATIVE — SIGNIFICANT CHANGE UP
B MICROTI IGG TITR SER: SIGNIFICANT CHANGE UP TITER
E CHAFFEENSIS IGG TITR SER IF: SIGNIFICANT CHANGE UP TITER

## 2021-06-04 ENCOUNTER — APPOINTMENT (OUTPATIENT)
Dept: CARDIOLOGY | Facility: CLINIC | Age: 66
End: 2021-06-04
Payer: COMMERCIAL

## 2021-06-04 ENCOUNTER — NON-APPOINTMENT (OUTPATIENT)
Age: 66
End: 2021-06-04

## 2021-06-04 VITALS
SYSTOLIC BLOOD PRESSURE: 142 MMHG | BODY MASS INDEX: 20.56 KG/M2 | WEIGHT: 131 LBS | HEIGHT: 67 IN | OXYGEN SATURATION: 100 % | DIASTOLIC BLOOD PRESSURE: 74 MMHG | HEART RATE: 75 BPM

## 2021-06-04 PROCEDURE — 99072 ADDL SUPL MATRL&STAF TM PHE: CPT

## 2021-06-04 PROCEDURE — 93000 ELECTROCARDIOGRAM COMPLETE: CPT

## 2021-06-04 PROCEDURE — 99204 OFFICE O/P NEW MOD 45 MIN: CPT

## 2021-06-04 NOTE — DISCUSSION/SUMMARY
[FreeTextEntry1] : Leida recently had an episode of syncope causing facial trauma, with a typical prodrome, suggestive of a vagal etiology.  She is not orthostatic today, though her heart rate did increase by about 10 bpm upon standing.  Her EKG demonstrates a sinus rhythm, without worrisome findings, and had a recent echocardiogram demonstrating normal left ventricular systolic function.  She has no significant carotid atherosclerosis on doppler\par Though I doubt an arrhythmogenic etiology, she has a monitor on, and I will review the results when available.\par She needs to stay very hydrated and increase her salt intake.  She needs to be very careful with hot showers as well.  She is willing to try compression stockings.  We have also reviewed counterpressure techniques.  We will see how she is doing in a few weeks with hydration.  If symptoms continue, despite volume expansion, we can discuss other treatment options including vasoconstrictors and serotonin reuptake inhibitors.  She knows to call with any issues or concerns.

## 2021-06-04 NOTE — PHYSICAL EXAM
Expected Date Of Service: 07/21/2020 [Well Developed] : well developed [Well Nourished] : well nourished [No Acute Distress] : no acute distress [Normal Conjunctiva] : normal conjunctiva [Normal Venous Pressure] : normal venous pressure [No Carotid Bruit] : no carotid bruit [Normal S1, S2] : normal S1, S2 [No Murmur] : no murmur [No Rub] : no rub [No Gallop] : no gallop [Clear Lung Fields] : clear lung fields [Good Air Entry] : good air entry [No Respiratory Distress] : no respiratory distress  [Soft] : abdomen soft [Non Tender] : non-tender [No Masses/organomegaly] : no masses/organomegaly [Normal Bowel Sounds] : normal bowel sounds [Normal Gait] : normal gait [No Edema] : no edema [No Cyanosis] : no cyanosis [No Clubbing] : no clubbing [No Varicosities] : no varicosities [No Rash] : no rash [No Skin Lesions] : no skin lesions [Moves all extremities] : moves all extremities [No Focal Deficits] : no focal deficits [Normal Speech] : normal speech [Alert and Oriented] : alert and oriented [Normal memory] : normal memory [de-identified] : +facial swelling/ecchymosis

## 2021-06-04 NOTE — ED POST DISCHARGE NOTE - RESULT SUMMARY
Pt called for tick bite results. Results are negative but pt notes she gets frequent bites, most recent about 1 week ago. Advise pt it can take over 6 weeks for test to become positive and in some cases is drawn as a baseline. Advise pt to call her PMD this week for f/u appt and retesting. Pt agrees with plan of  care. Offered pt rx for doxy, she declines at this time. signed Vanessa Armenta PA-C

## 2021-06-04 NOTE — HISTORY OF PRESENT ILLNESS
[FreeTextEntry1] : Leida is a 65-year-old female here for evaluation.\par \par She is generally healthy.  On 5/31/2021, she presented to the emergency room with an episode of syncope and confusion.  She remembers being in the shower, feeling lightheaded, and eventually passed out.  She does not remember the remainder of the events.  She was found with facial lacerations and is status post repair.  She was found to have a open right nasal maxillary fracture and was advised to have an oculoplastic evaluation at [facility].  She was found to have some PVCs on telemetry.\par \par Carotid sonogram was unremarkable.  Echocardiogram demonstrated normal left ventricular systolic function, with possible mild RV enlargement.  She was discharged wearing a monitor.\par \par Today, she is here to establish care.  She has had no additional syncopal episodes.  She does report a history of low blood pressure.  She reports 2 episodes of vasovagal syncope in the past, which sounds similar in description.  She has lost both bladder and bowel control during these episodes.  She does not take any medications.  She denies lower extremity swelling, orthopnea, PND, dizziness, lightheadedness, and near syncope.

## 2021-06-04 NOTE — HISTORY OF PRESENT ILLNESS
[FreeTextEntry1] : Leida is a 65-year-old female here for evaluation.\par \par She is generally healthy.  On 5/31/2021, she presented to the emergency room with an episode of syncope and confusion.  She remembers being in the shower, feeling lightheaded, and eventually passed out.  She does not remember the remainder of the events.  She was found with facial lacerations and is status post repair.  She was found to have a open right nasal maxillary fracture and was advised to have an oculoplastic evaluation at [facility].  She was found to have some PVCs on telemetry.\par \par Carotid sonogram was unremarkable.  Echocardiogram demonstrated normal left ventricular systolic function, with possible mild RV enlargement.  She was discharged wearing a monitor.\par \par Today, she is here to establish care.  She has had no additional syncopal episodes.  She does report a history of low blood pressure.  She reports 2 episodes of vasovagal syncope in the past, which sounds similar in description.  She has lost both bladder and bowel control during these episodes.  She does not take any medications.  She denies lower extremity swelling, orthopnea, PND, dizziness, lightheadedness, and near syncope.

## 2021-06-08 ENCOUNTER — APPOINTMENT (OUTPATIENT)
Dept: OPHTHALMOLOGY | Facility: CLINIC | Age: 66
End: 2021-06-08
Payer: COMMERCIAL

## 2021-06-08 ENCOUNTER — NON-APPOINTMENT (OUTPATIENT)
Age: 66
End: 2021-06-08

## 2021-06-08 PROCEDURE — 92004 COMPRE OPH EXAM NEW PT 1/>: CPT

## 2021-06-08 PROCEDURE — 99072 ADDL SUPL MATRL&STAF TM PHE: CPT

## 2021-07-01 NOTE — CONSULT NOTE ADULT - NSTELEHEALTH_GEN_ALL_CORE
Annalisa Dhaliwal MD 13 hours ago (6:20 PM)     Yes 10 please     MD Amy Vaughan 10 days ago   FW  I sent a prescription for 20 mg for the lexapro since that's what you were on previously on. If you prefer for me to call in the lower dose of 10 mg then let me know and I will change the prescription. rx pended for signature.
No
No

## 2021-09-05 PROBLEM — Z80.0 FAMILY HISTORY OF PANCREATIC CANCER: Status: ACTIVE | Noted: 2021-09-05

## 2021-09-05 PROBLEM — Z87.19 HISTORY OF GASTRIC POLYP: Status: RESOLVED | Noted: 2021-05-27 | Resolved: 2021-09-05

## 2021-09-05 RX ORDER — FLUDROCORTISONE ACETATE 0.1 MG
TABLET ORAL
Refills: 0 | Status: DISCONTINUED | COMMUNITY
End: 2021-09-05

## 2021-09-05 RX ORDER — SODIUM SULFATE, POTASSIUM SULFATE, MAGNESIUM SULFATE 17.5; 3.13; 1.6 G/ML; G/ML; G/ML
17.5-3.13-1.6 SOLUTION, CONCENTRATE ORAL
Qty: 1 | Refills: 0 | Status: DISCONTINUED | COMMUNITY
Start: 2017-09-08 | End: 2021-09-05

## 2021-09-08 ENCOUNTER — LABORATORY RESULT (OUTPATIENT)
Age: 66
End: 2021-09-08

## 2021-09-08 ENCOUNTER — APPOINTMENT (OUTPATIENT)
Dept: FAMILY MEDICINE | Facility: CLINIC | Age: 66
End: 2021-09-08
Payer: COMMERCIAL

## 2021-09-08 VITALS
HEIGHT: 67 IN | OXYGEN SATURATION: 96 % | BODY MASS INDEX: 19.93 KG/M2 | TEMPERATURE: 96 F | SYSTOLIC BLOOD PRESSURE: 96 MMHG | WEIGHT: 127 LBS | HEART RATE: 62 BPM | DIASTOLIC BLOOD PRESSURE: 54 MMHG

## 2021-09-08 DIAGNOSIS — R55 SYNCOPE AND COLLAPSE: ICD-10-CM

## 2021-09-08 DIAGNOSIS — Z87.19 PERSONAL HISTORY OF OTHER DISEASES OF THE DIGESTIVE SYSTEM: ICD-10-CM

## 2021-09-08 DIAGNOSIS — D64.9 ANEMIA, UNSPECIFIED: ICD-10-CM

## 2021-09-08 DIAGNOSIS — Z87.81 PERSONAL HISTORY OF (HEALED) TRAUMATIC FRACTURE: ICD-10-CM

## 2021-09-08 DIAGNOSIS — Z80.0 FAMILY HISTORY OF MALIGNANT NEOPLASM OF DIGESTIVE ORGANS: ICD-10-CM

## 2021-09-08 PROCEDURE — 36415 COLL VENOUS BLD VENIPUNCTURE: CPT

## 2021-09-08 PROCEDURE — 99397 PER PM REEVAL EST PAT 65+ YR: CPT | Mod: 25

## 2021-09-08 RX ORDER — BUTALBITAL, ASPIRIN AND CAFFEINE 50; 325; 40 MG/1; MG/1; MG/1
TABLET ORAL EVERY 4 HOURS
Refills: 0 | Status: DISCONTINUED | COMMUNITY
End: 2021-09-08

## 2021-09-08 NOTE — PHYSICAL EXAM
[No Acute Distress] : no acute distress [Well Nourished] : well nourished [Well Developed] : well developed [Normal Sclera/Conjunctiva] : normal sclera/conjunctiva [Well-Appearing] : well-appearing [EOMI] : extraocular movements intact [Normal Outer Ear/Nose] : the outer ears and nose were normal in appearance [No JVD] : no jugular venous distention [No Lymphadenopathy] : no lymphadenopathy [Supple] : supple [Thyroid Normal, No Nodules] : the thyroid was normal and there were no nodules present [No Respiratory Distress] : no respiratory distress  [No Accessory Muscle Use] : no accessory muscle use [Clear to Auscultation] : lungs were clear to auscultation bilaterally [Normal Rate] : normal rate  [Regular Rhythm] : with a regular rhythm [Normal S1, S2] : normal S1 and S2 [No Murmur] : no murmur heard [No Carotid Bruits] : no carotid bruits [No Varicosities] : no varicosities [Pedal Pulses Present] : the pedal pulses are present [No Edema] : there was no peripheral edema [No Extremity Clubbing/Cyanosis] : no extremity clubbing/cyanosis [Soft] : abdomen soft [Non Tender] : non-tender [Non-distended] : non-distended [No Masses] : no abdominal mass palpated [No HSM] : no HSM [Normal Bowel Sounds] : normal bowel sounds [Normal Posterior Cervical Nodes] : no posterior cervical lymphadenopathy [Normal Anterior Cervical Nodes] : no anterior cervical lymphadenopathy [No CVA Tenderness] : no CVA  tenderness [No Spinal Tenderness] : no spinal tenderness [Grossly Normal Strength/Tone] : grossly normal strength/tone [No Joint Swelling] : no joint swelling [No Rash] : no rash [Coordination Grossly Intact] : coordination grossly intact [No Focal Deficits] : no focal deficits [Normal Gait] : normal gait [Normal Affect] : the affect was normal [Deep Tendon Reflexes (DTR)] : deep tendon reflexes were 2+ and symmetric [Normal Insight/Judgement] : insight and judgment were intact [de-identified] : slender frame Consent (Lip)/Introductory Paragraph: The rationale for Mohs was explained to the patient and consent was obtained. The risks, benefits and alternatives to therapy were discussed in detail. Specifically, the risks of lip deformity, changes in the oral aperture, infection, scarring, bleeding, prolonged wound healing, incomplete removal, allergy to anesthesia, nerve injury and recurrence were addressed. Prior to the procedure, the treatment site was clearly identified and confirmed by the patient. All components of Universal Protocol/PAUSE Rule completed.

## 2021-09-08 NOTE — REVIEW OF SYSTEMS
[Joint Pain] : joint pain [Joint Stiffness] : joint stiffness [Headache] : headache [Negative] : Heme/Lymph [FreeTextEntry5] : syncope event 5/2021, see HPI [FreeTextEntry9] : OA related joint pain and stiffness [de-identified] : occas migraines, has Fiorinal on hand for occas prn use

## 2021-09-08 NOTE — PLAN
[FreeTextEntry1] : Reviewed age-appropriate preventive screening tests with patient. UTD on colonoscopy. DEXA will be due in/around 1/2022. Mammogram due now vs. in early 2022 and she will sched for near future/when due. Prevnar due in/after 12/2021. \par \par She plans to get flu vacc at work in near future. \par \par Discussed clean eating (eg Mediterranean style eating plan) and regular exercise/staying as physically active as possible. Work on balance exercises, do strength training, fall precautions revd. Try drinking some electrolyte solution daily as well as drink plenty of water daily to decrease risk for syncope events. Revd head CT results from 5/2021 visit and scanned these into her chart\par \par Strongly recommend she consider Rx med for treatment of osteoporosis (either oral bisphosphonate med or injectable med such as Prolia) +/- consultation with specialist to review treatment options. She strongly prefers to avoid Rx meds for bones but is willing to do repeat DEXA in early 2022 in case those results might change her thinking. \par \par Reviewed importance of good self care (eg meditation, yoga, adequate rest, regular exercise, magnesium, clean eating etc).\par \par Check labs today. Eat iron rich foods daily. \par \par Fiorinal r/b/se revd and revd need to limit use of med as much as possible. Ok to cont to use prn since it is helpful for her and well tolerated. \par \par Next PE in 1-2 yrs.

## 2021-09-08 NOTE — HISTORY OF PRESENT ILLNESS
[de-identified] : Her last PE was 9/2020\par \par Her last tetanus shot was 3/2015\par Pneumovax 12/2020, Prevnar- not yet \par Shingrix-- had two doses in 2020 she states\par Has had Moderna COVID vaccine series\par \par Her diet is clean/healthful overall\par Exercises regularly (walking, strength training/body weight exercises) \par \par Her last colonoscopy was 2017 wnl (Dr. Lau) \par Her last mammogram was 2/2020, thinks had done again in early 2021 and will check with radiology\par Her last DEXA was 1/2020 -2.6 lowest T score fem neck\par Her last gyn exam was years ago (and she defers on further gyn checks as is s/p ROSALINA/BSO for benign reasons and has no gyn sxs)\par \par Leida is also here to f/u high chol, IFG, migraine HA, iron def anemia, osteoporosis.\par \par She has intermittent mild iron def over the course of years. GI eval in 2009 incl EGD and colonoscopy was negative. No s/sxs bleeding. She had f/u GI eval in 2017 and repeat colonoscopy was again negative but no addtl testing was done. She has not noted any bleeding of any type and she notes that she feels that her iron def anemia is most likely due to suboptimal dietary iron intake. She eats some iron rich vegetarian sources but not consistently and she generally does not eat red meat. Last Hgb was 11.4 in 9/2020 but iron levels were wnl. Hgb 12 range in ED 5/2021 after syncopal event but no iron levels checked. Would like CBC and iron levels checked with labs today\par \par Only gets rare migraine at this point in her life. Fiorinal is the med that works well for her and she does not use this often but likes to keep on hand. Needs Rf \par \par Based on 1/2020 DEXA results we disc at her 9/2020 PE visit about oral bisphosphonate trial vs. see specialist to discuss/consider injectable med (eg Prolia). She has to date deferred on Rx med for osteoporosis and on seeing specialist. Next DEXA will be due in/around 1/2022.\par \par Fell twice in past year. Once broke R wrist 12/2020 (trip and fall). In 5/2021 she had syncopal event and fell and broke nose/hit head. Had cardiology and neurology and ophtho and plastics evaluations after fall where she fell on her face and broke her nose. All evaluations were wnl except was concluded that she likely a vasovagal event and she was advised to hydrate well. Had EEG, Holter, Echo etc. No meds changes were made. She is trying to hydrate well. She will consider electrolyte solution daily. CT showed chronic right basal ganglia lacunar infarct. Neuro advised her this is not significant . She notes that vasovagal episodes occur in other family members who tend to run low BPs\par \par Would like Lyme test with her labs today as she gets tick bites periodically all through the year and both neighbors have Lyme disease right now. No fevers or acute sxs at this time.

## 2021-09-08 NOTE — ASSESSMENT
[FreeTextEntry1] : GEOVANNI HUITRON is a 65 year old female here for a physical exam and f/u of above medical issues.\par \par

## 2021-09-09 ENCOUNTER — TRANSCRIPTION ENCOUNTER (OUTPATIENT)
Age: 66
End: 2021-09-09

## 2021-09-10 ENCOUNTER — TRANSCRIPTION ENCOUNTER (OUTPATIENT)
Age: 66
End: 2021-09-10

## 2021-09-10 LAB
ALBUMIN SERPL ELPH-MCNC: 4.6 G/DL
ALP BLD-CCNC: 74 U/L
ALT SERPL-CCNC: 16 U/L
ANION GAP SERPL CALC-SCNC: 13 MMOL/L
AST SERPL-CCNC: 19 U/L
B BURGDOR IGG+IGM SER QL IB: NORMAL
BASOPHILS # BLD AUTO: 0.07 K/UL
BASOPHILS NFR BLD AUTO: 1.5 %
BILIRUB SERPL-MCNC: 0.4 MG/DL
BUN SERPL-MCNC: 7 MG/DL
CALCIUM SERPL-MCNC: 9 MG/DL
CHLORIDE SERPL-SCNC: 96 MMOL/L
CHOLEST SERPL-MCNC: 192 MG/DL
CO2 SERPL-SCNC: 22 MMOL/L
CREAT SERPL-MCNC: 0.62 MG/DL
EOSINOPHIL # BLD AUTO: 0.16 K/UL
EOSINOPHIL NFR BLD AUTO: 3.4 %
ESTIMATED AVERAGE GLUCOSE: 114 MG/DL
FERRITIN SERPL-MCNC: 22 NG/ML
GLUCOSE SERPL-MCNC: 88 MG/DL
HBA1C MFR BLD HPLC: 5.6 %
HCT VFR BLD CALC: 34.6 %
HDLC SERPL-MCNC: 86 MG/DL
HGB BLD-MCNC: 11.2 G/DL
IMM GRANULOCYTES NFR BLD AUTO: 0 %
IRON SATN MFR SERPL: 29 %
IRON SERPL-MCNC: 99 UG/DL
LDLC SERPL CALC-MCNC: 99 MG/DL
LYMPHOCYTES # BLD AUTO: 1.22 K/UL
LYMPHOCYTES NFR BLD AUTO: 26.1 %
MAN DIFF?: NORMAL
MCHC RBC-ENTMCNC: 30.4 PG
MCHC RBC-ENTMCNC: 32.4 GM/DL
MCV RBC AUTO: 94 FL
MONOCYTES # BLD AUTO: 0.46 K/UL
MONOCYTES NFR BLD AUTO: 9.8 %
NEUTROPHILS # BLD AUTO: 2.77 K/UL
NEUTROPHILS NFR BLD AUTO: 59.2 %
NONHDLC SERPL-MCNC: 106 MG/DL
PLATELET # BLD AUTO: 290 K/UL
POTASSIUM SERPL-SCNC: 4.5 MMOL/L
PROT SERPL-MCNC: 7.3 G/DL
RBC # BLD: 3.68 M/UL
RBC # FLD: 13.2 %
SODIUM SERPL-SCNC: 130 MMOL/L
TIBC SERPL-MCNC: 341 UG/DL
TRIGL SERPL-MCNC: 35 MG/DL
TSH SERPL-ACNC: 0.99 UIU/ML
UIBC SERPL-MCNC: 241 UG/DL
WBC # FLD AUTO: 4.68 K/UL

## 2021-09-13 DIAGNOSIS — Z92.89 PERSONAL HISTORY OF OTHER MEDICAL TREATMENT: ICD-10-CM

## 2021-09-16 ENCOUNTER — TRANSCRIPTION ENCOUNTER (OUTPATIENT)
Age: 66
End: 2021-09-16

## 2021-09-16 PROBLEM — Z92.89 H/O MAMMOGRAM: Status: RESOLVED | Noted: 2021-05-27 | Resolved: 2021-09-16

## 2021-09-23 ENCOUNTER — TRANSCRIPTION ENCOUNTER (OUTPATIENT)
Age: 66
End: 2021-09-23

## 2021-10-31 ENCOUNTER — TRANSCRIPTION ENCOUNTER (OUTPATIENT)
Age: 66
End: 2021-10-31

## 2021-11-08 ENCOUNTER — APPOINTMENT (OUTPATIENT)
Dept: FAMILY MEDICINE | Facility: CLINIC | Age: 66
End: 2021-11-08

## 2022-01-01 NOTE — ED PROVIDER NOTE - ADMIT DISPOSITION PRESENT ON ADMISSION SEPSIS
Reason for Call:  Other appointment    Detailed comments: Need a  appt for next week with pediatric provider. Pt born at . Please call dad back.     Phone Number Patient can be reached at: [phone]    Best Time: anytime    Can we leave a detailed message on this number? NO, not sure if VM is set up.     Call taken on 2022 at 10:58 AM by Katheryn Dominguez      
LM advising of appt w/ Dr. Victoriano Barajas on Monday, 511/22 @ 9:40AM.  -Rosaline Pedroza      
No

## 2022-01-02 ENCOUNTER — OUTPATIENT (OUTPATIENT)
Dept: OUTPATIENT SERVICES | Facility: HOSPITAL | Age: 67
LOS: 1 days | End: 2022-01-02
Payer: COMMERCIAL

## 2022-01-02 DIAGNOSIS — Z20.828 CONTACT WITH AND (SUSPECTED) EXPOSURE TO OTHER VIRAL COMMUNICABLE DISEASES: ICD-10-CM

## 2022-01-02 PROCEDURE — U0003: CPT

## 2022-01-02 PROCEDURE — U0005: CPT

## 2022-01-03 DIAGNOSIS — Z20.828 CONTACT WITH AND (SUSPECTED) EXPOSURE TO OTHER VIRAL COMMUNICABLE DISEASES: ICD-10-CM

## 2022-02-15 ENCOUNTER — APPOINTMENT (OUTPATIENT)
Dept: ORTHOPEDIC SURGERY | Facility: CLINIC | Age: 67
End: 2022-02-15
Payer: COMMERCIAL

## 2022-02-15 ENCOUNTER — NON-APPOINTMENT (OUTPATIENT)
Age: 67
End: 2022-02-15

## 2022-02-15 VITALS
BODY MASS INDEX: 21.5 KG/M2 | SYSTOLIC BLOOD PRESSURE: 109 MMHG | WEIGHT: 137 LBS | DIASTOLIC BLOOD PRESSURE: 68 MMHG | HEART RATE: 70 BPM | HEIGHT: 67 IN

## 2022-02-15 DIAGNOSIS — M79.645 PAIN IN LEFT FINGER(S): ICD-10-CM

## 2022-02-15 PROCEDURE — 73130 X-RAY EXAM OF HAND: CPT | Mod: LT

## 2022-02-15 PROCEDURE — 99213 OFFICE O/P EST LOW 20 MIN: CPT

## 2022-02-15 NOTE — HISTORY OF PRESENT ILLNESS
[FreeTextEntry1] : 66 year female presents for evaluation of left hand pain present longstanding, worsening over the last couple of months.  She denies acute injury or inciting event.  She reports her pain has been worsening since onset.  She localizes the pain to the region of the MCP joint of the left thumb with radiation circumferentially.  She reports stiffness of the digits with use of her hand.  She notes difficulty gripping and turning objects.  She denies regular use of medication for pain.  She denies recent imaging taken to date.  Of note she reports a prior wrist fracture which was treated with cast immobilization which she reports may have worsened her left hand pain due to over compensation.

## 2022-02-15 NOTE — ASSESSMENT
[FreeTextEntry1] : 66 year old female with left thumb pain. \par Patient has mild pain along the MCP joint, activity related. Patient has been recommended for use of a comfort cool thumb  brace as needed for activity, topical vs oral NSAIDs as needed, and will follow up prn.

## 2022-02-15 NOTE — PHYSICAL EXAM
[Normal Finger/nose] : finger to nose coordination [Normal] : no peripheral adenopathy appreciated [de-identified] : Left hand: \par Skin intact, no erythema, no ecchymosis, no gross deformity. There is mild TTP along the MCP joint of the left thumb. -TTP basal joint, negative grind. There is a negative crank test. There is no CMC joint swelling. There is no evidence of infection or lymphadenopathy bilaterally to the level of the elbows.  There is no evidence of MCP hyperextension bilaterally. There is a negative Finkelstein's test There is no tenderness over the A-1 pulleys bilaterally. 5/5 strength bilaterally. \par \par The wrists have a symmetric and full range of motion bilaterally with no pain upon forced flexion, extension, pronation and supination. There is no tenderness over the scaphoid scapholunate region ligaments and no tenderness of the TFCC bilaterally. There is no tenderness of the pisotriquetral hamate hook. The second through fifth CMC his is stable and nontender bilaterally.\par There is a negative carpal tunnel compression test or Tinel's bilaterally.   [de-identified] : rhodar [de-identified] : The following radiographs were ordered and read by me during this patients visit. I reviewed each radiograph in detail with the patient and discussed the findings as highlighted below. \par 3 xray views of the left hand taken today reveal no acute fx or osseous abnormality.

## 2022-06-14 NOTE — ED PROCEDURE NOTE - NS ED PERI NEURO NEG
English Pre-application: Motor, sensory, and vascular responses intact in the injured extremity./Post-application: Motor, sensory, and vascular responses intact in the injured extremity./The patient/caregiver verbalized understanding of how to care for the injured extremity with splint

## 2022-06-16 ENCOUNTER — TRANSCRIPTION ENCOUNTER (OUTPATIENT)
Age: 67
End: 2022-06-16

## 2022-06-17 ENCOUNTER — NON-APPOINTMENT (OUTPATIENT)
Age: 67
End: 2022-06-17

## 2022-06-22 ENCOUNTER — TRANSCRIPTION ENCOUNTER (OUTPATIENT)
Age: 67
End: 2022-06-22

## 2022-07-21 ENCOUNTER — APPOINTMENT (OUTPATIENT)
Dept: GASTROENTEROLOGY | Facility: CLINIC | Age: 67
End: 2022-07-21

## 2022-07-21 VITALS
OXYGEN SATURATION: 98 % | TEMPERATURE: 97.1 F | HEART RATE: 70 BPM | DIASTOLIC BLOOD PRESSURE: 70 MMHG | BODY MASS INDEX: 19.15 KG/M2 | WEIGHT: 122 LBS | HEIGHT: 67 IN | SYSTOLIC BLOOD PRESSURE: 114 MMHG

## 2022-07-21 DIAGNOSIS — Z12.11 ENCOUNTER FOR SCREENING FOR MALIGNANT NEOPLASM OF COLON: ICD-10-CM

## 2022-07-21 PROCEDURE — 99203 OFFICE O/P NEW LOW 30 MIN: CPT

## 2022-07-21 NOTE — HISTORY OF PRESENT ILLNESS
[FreeTextEntry1] : New patient presents to the office today for evaluation prior to colonoscopy.  Patient was last seen about 5 years ago colonoscopy was performed at that time.  Patient feels well and offers no complaints of nausea vomiting fever chills rectal bleeding or melena.  She has no cardiac or pulmonary complaints.

## 2022-07-21 NOTE — ASSESSMENT
[FreeTextEntry1] : Impression and plan\par \par Patient came to the office today to arrange for colonoscopy.  The risks benefits alternatives and limitations were discussed.  Further suggestions will follow after above.  Call back as needed in the interim.

## 2022-08-24 ENCOUNTER — APPOINTMENT (OUTPATIENT)
Dept: GASTROENTEROLOGY | Facility: CLINIC | Age: 67
End: 2022-08-24

## 2022-09-09 ENCOUNTER — OUTPATIENT (OUTPATIENT)
Dept: OUTPATIENT SERVICES | Facility: HOSPITAL | Age: 67
LOS: 1 days | End: 2022-09-09
Payer: COMMERCIAL

## 2022-09-09 ENCOUNTER — APPOINTMENT (OUTPATIENT)
Dept: RADIOLOGY | Facility: CLINIC | Age: 67
End: 2022-09-09

## 2022-09-09 DIAGNOSIS — M81.0 AGE-RELATED OSTEOPOROSIS WITHOUT CURRENT PATHOLOGICAL FRACTURE: ICD-10-CM

## 2022-09-09 PROCEDURE — 77085 DXA BONE DENSITY AXL VRT FX: CPT | Mod: 26

## 2022-09-09 PROCEDURE — 77085 DXA BONE DENSITY AXL VRT FX: CPT

## 2022-09-13 ENCOUNTER — APPOINTMENT (OUTPATIENT)
Dept: FAMILY MEDICINE | Facility: CLINIC | Age: 67
End: 2022-09-13

## 2022-09-13 LAB — SARS-COV-2 N GENE NPH QL NAA+PROBE: NOT DETECTED

## 2022-09-15 ENCOUNTER — APPOINTMENT (OUTPATIENT)
Dept: GASTROENTEROLOGY | Facility: AMBULATORY MEDICAL SERVICES | Age: 67
End: 2022-09-15

## 2022-09-15 ENCOUNTER — TRANSCRIPTION ENCOUNTER (OUTPATIENT)
Age: 67
End: 2022-09-15

## 2022-09-15 PROCEDURE — 45378 DIAGNOSTIC COLONOSCOPY: CPT | Mod: 33

## 2022-10-11 ENCOUNTER — APPOINTMENT (OUTPATIENT)
Dept: FAMILY MEDICINE | Facility: CLINIC | Age: 67
End: 2022-10-11

## 2022-10-11 ENCOUNTER — LABORATORY RESULT (OUTPATIENT)
Age: 67
End: 2022-10-11

## 2022-10-11 VITALS
HEIGHT: 66 IN | TEMPERATURE: 97.5 F | DIASTOLIC BLOOD PRESSURE: 80 MMHG | WEIGHT: 128 LBS | HEART RATE: 85 BPM | OXYGEN SATURATION: 99 % | BODY MASS INDEX: 20.57 KG/M2 | SYSTOLIC BLOOD PRESSURE: 124 MMHG

## 2022-10-11 DIAGNOSIS — Z23 ENCOUNTER FOR IMMUNIZATION: ICD-10-CM

## 2022-10-11 DIAGNOSIS — G43.909 MIGRAINE, UNSPECIFIED, NOT INTRACTABLE, W/OUT STATUS MIGRAINOSUS: ICD-10-CM

## 2022-10-11 PROCEDURE — 99397 PER PM REEVAL EST PAT 65+ YR: CPT | Mod: 25

## 2022-10-11 PROCEDURE — 90670 PCV13 VACCINE IM: CPT

## 2022-10-11 PROCEDURE — 36415 COLL VENOUS BLD VENIPUNCTURE: CPT

## 2022-10-11 PROCEDURE — 90471 IMMUNIZATION ADMIN: CPT

## 2022-10-11 RX ORDER — SODIUM SULFATE, POTASSIUM SULFATE, MAGNESIUM SULFATE 17.5; 3.13; 1.6 G/ML; G/ML; G/ML
17.5-3.13-1.6 SOLUTION, CONCENTRATE ORAL
Qty: 1 | Refills: 0 | Status: DISCONTINUED | COMMUNITY
Start: 2022-07-21 | End: 2022-10-11

## 2022-10-11 NOTE — ASSESSMENT
[FreeTextEntry1] : GEOVANNI HUITRON is a 66 year old female here for a physical exam.  She is also here to follow up on medical issues as noted above.\par \par Patient has a history of hypercholesterolemia, osteoporosis, impaired fasting glucose, and iron deficiency anemia.

## 2022-10-11 NOTE — PLAN
[FreeTextEntry1] : Continue all medications as prescribed. Check labs as above. Will adjust any medications based upon lab results. \par \par Reviewed age-appropriate preventive screening tests with patient. Routine gyn exams are no longer indicated for her at this stage of life and she prefers to not have these routinely anymore; she will go if she ever has any gyn/ symptoms or concerns. UTD on colonoscopy, DEXA exams. Due for mammogram and she will schedule for near future. She defers on ECG today. \par \par Reviewed/recommended Prevnar 20 vs Prevnar 13 and she opts to have dose of Prevnar 13 to complete pneumococcal series and she plans flu vacc at work in near future.\par \par BP goal is <=135/85 on average on home checks. Advised to let us know if BPs are above goal on home checks. \par \par LDL goal <=100 ideally. Reviewed LDL goal and ASCVD risk estimate and factors that go into this calculation.  Reviewed risks/benefits of statin med. Reviewed red yeast rice RYR (600-1200 mg daily) risks/benefits as an alternative to statin meds if not ready to consider statin meds. Recommended excellent hydration +/- co Q10 (100-400 mg daily) to decrease risk for statin (or RYR) related myalgias. \par \par Discussed clean eating (eg Mediterranean style eating plan) and regular exercise/staying as physically active as possible.  Include balance exercises and strength training and core strengthening exercises for bone health and to decrease risk for falls. \par \par Eat iron rich foods. Reviewed option to see hematologist for further eval of persistent mild anemia (+/- intermittent iron deficiency) over years. She defers on desire/need to see specialist and feels when she is eating more iron rich foods she has been H/H/iron levels and so will focus on optimizing dietary iron intake. If she ever has any s/sxs bleeding she will f/u with GI and here and if she ever feels she has worsening s/sxs iron def/anemia or worsening labs she will consider hematology eval. \par \par Revd diagnosis of osteoporosis, increased risk for fractures and related consequences and Rx medication options to treat including oral bisphosphonates (eg Alendronate, Risedronate) and r/b/se of these incl GI SE and increased risk for jaw osteonecrosis and atypical femur fractures mainly seen in patients with prolonged/long term use. Reviewed also option to see endo or rheum specialist for evaluation and consideration of other treatments (eg Prolia, Forteo, Reclast) and briefly revd those r/b/se. Reviewed need to consider pros/cons of a particular treatment against pros/cons of not treating osteoporosis. Recommend repeat DEXA at 2 year regine. \par \par After discussion today she notes that she prefers to continue to defer on Rx med for bones and she does not wish to see Endo or rheum specialist at this time but can let me know or schedule if she reconsiders. She added nilesh chi and will also add strength training 3x/wk and will optimize Ca+ and D intake \par \par Recommended calcium 6318-5579 mg daily ideally mainly or fully from food sources +/- supplement if needed, vit D 8924-2921 IU or whatever dose is needed to get D into 30-80 range. Recommended regular weight bearing exercise as well as strength training exercise 3 or more times per week and balance exercises regularly. \par \par Reviewed importance of good self care (e.g. meditation, yoga, adequate rest, regular exercise, magnesium, clean eating, etc.). \par \par Follow up for next physical in one year. RPA (chol, anemia, osteoporosis etc) visit in 6 month and repeat fasting labs at that time. \par

## 2022-10-11 NOTE — PHYSICAL EXAM
[No Acute Distress] : no acute distress [Well Nourished] : well nourished [Well Developed] : well developed [Well-Appearing] : well-appearing [Normal Sclera/Conjunctiva] : normal sclera/conjunctiva [EOMI] : extraocular movements intact [Normal Outer Ear/Nose] : the outer ears and nose were normal in appearance [No JVD] : no jugular venous distention [No Lymphadenopathy] : no lymphadenopathy [Supple] : supple [Thyroid Normal, No Nodules] : the thyroid was normal and there were no nodules present [No Respiratory Distress] : no respiratory distress  [No Accessory Muscle Use] : no accessory muscle use [Clear to Auscultation] : lungs were clear to auscultation bilaterally [Normal Rate] : normal rate  [Regular Rhythm] : with a regular rhythm [Normal S1, S2] : normal S1 and S2 [No Murmur] : no murmur heard [No Carotid Bruits] : no carotid bruits [No Varicosities] : no varicosities [Pedal Pulses Present] : the pedal pulses are present [No Edema] : there was no peripheral edema [No Extremity Clubbing/Cyanosis] : no extremity clubbing/cyanosis [Soft] : abdomen soft [Non Tender] : non-tender [Non-distended] : non-distended [No Masses] : no abdominal mass palpated [No HSM] : no HSM [Normal Bowel Sounds] : normal bowel sounds [Normal Posterior Cervical Nodes] : no posterior cervical lymphadenopathy [Normal Anterior Cervical Nodes] : no anterior cervical lymphadenopathy [No CVA Tenderness] : no CVA  tenderness [No Spinal Tenderness] : no spinal tenderness [No Joint Swelling] : no joint swelling [Grossly Normal Strength/Tone] : grossly normal strength/tone [No Rash] : no rash [Coordination Grossly Intact] : coordination grossly intact [No Focal Deficits] : no focal deficits [Normal Gait] : normal gait [Deep Tendon Reflexes (DTR)] : deep tendon reflexes were 2+ and symmetric [Normal Affect] : the affect was normal [Normal Insight/Judgement] : insight and judgment were intact [de-identified] : slender frame

## 2022-10-11 NOTE — REVIEW OF SYSTEMS
[Joint Pain] : joint pain [Joint Stiffness] : joint stiffness [Headache] : headache [Negative] : Heme/Lymph [FreeTextEntry9] : OA related joint pain and stiffness, still finds ticks on dog/on her as she and dog spend a lot of time in yard and garden; she keeps Doxy on hand for prn prophylaxis and limits use to only when high risk tick bite occurs but feels better to keep med on hand and would like RF and would like Lyme test today with labs [de-identified] : occas migraines, has Fiorinal on hand for occas prn use

## 2022-10-11 NOTE — HEALTH RISK ASSESSMENT
[No falls in past year] : Patient reported no falls in the past year [0] : 2) Feeling down, depressed, or hopeless: Not at all (0) [PHQ-2 Negative - No further assessment needed] : PHQ-2 Negative - No further assessment needed [[id number]] : 0

## 2022-10-11 NOTE — HISTORY OF PRESENT ILLNESS
[de-identified] : Her last physical exam was last year \par \par Vaccines: \par Tetanus is up to date; last Tdap 3/16/2015\par Pneumococcal vaccination is NOT up to date; one dose PPSV 12/4/2020\par Shingrix is up to date \par COVID vaccine is up to date (Moderna)\par \par Her last dentist visit was less than one year ago \par Her last eye doctor appointment was less than one year ago \par \par GYN visit is up to date as routine gyn exams are no longer indicated for her; last exam was several years ago. She defers on further gyn checks at this point in her life which is reasonable as she is s/p ROSALINA/BSO for benign reasons and has no gyn sxs\par Mammogram is due; last done 9/14/2021\par Colon cancer screening is up to date; last colonoscopy 9/15/2022, polyp, repeat due at 5 yrs, Dr. Abdi\par DEXA is up to date; last 9/9/2022 osteoporosis in hip and L spine, -2.8 L spine was lowest T score\par \par Her diet is healthy overall \par Exercise: walking, strength training, recently also added nilesh chi when she saw most recent DEXA results\par \par Leida is also here to f/u high chol, IFG, migraine HA, iron def anemia, osteoporosis.\par \par She has intermittent mild iron def over the course of years. GI eval in 2009 incl EGD and colonoscopy was negative. No s/sxs bleeding. She had f/u GI eval in 2017 and repeat colonoscopy was again negative but no addtl testing was done. She has not noted any bleeding of any type and she notes that she feels that her iron def anemia is most likely due to suboptimal dietary iron intake. She eats some iron rich vegetarian sources but not consistently and she generally does not eat red meat. Last Hgb was 11.2 in 9/2021 but iron levels were wnl. Would like CBC and iron levels checked with labs today. She had updated colonoscopy 9/2022 showing only 1 small benign polyp. SHe states that her GI did not feel she needed any addtl GI testing at this time for her mild anemia\par \par Only gets rare migraine at this point in her life. Fiorinal is the med that works well for her and she does not use this often but likes to keep on hand. Needs Rf \par \par Based on 1/2020 DEXA results we disc at her 9/2020 PE visit about oral bisphosphonate trial vs. see specialist to discuss/consider injectable med (eg Prolia). She has to date deferred on Rx med for osteoporosis and on seeing specialist. She recently had repeat DEXA showing osteoporosis in hip and L spine, worsened since 2020 test (-2.8 L spine was lowest T score). I recommended she start Rx med and/or see specialist for further eval/discussion of treatment options for her osteoporosis. She added nilesh chi. She still strongly prefers to not take Rx med for bones and she does not feel she needs/wants to see specialist re osteoporosis at this time. \par \par She is retiring at the end of this calendar year! Expecting a second grandchild and will spend more time travelling to [facility] to see her grandchildren as well as travelling to see her other children

## 2022-10-17 ENCOUNTER — RESULT REVIEW (OUTPATIENT)
Age: 67
End: 2022-10-17

## 2022-10-17 ENCOUNTER — TRANSCRIPTION ENCOUNTER (OUTPATIENT)
Age: 67
End: 2022-10-17

## 2022-10-17 ENCOUNTER — OUTPATIENT (OUTPATIENT)
Dept: OUTPATIENT SERVICES | Facility: HOSPITAL | Age: 67
LOS: 1 days | End: 2022-10-17
Payer: COMMERCIAL

## 2022-10-17 ENCOUNTER — APPOINTMENT (OUTPATIENT)
Dept: MAMMOGRAPHY | Facility: CLINIC | Age: 67
End: 2022-10-17

## 2022-10-17 DIAGNOSIS — Z00.00 ENCOUNTER FOR GENERAL ADULT MEDICAL EXAMINATION WITHOUT ABNORMAL FINDINGS: ICD-10-CM

## 2022-10-17 DIAGNOSIS — Z00.8 ENCOUNTER FOR OTHER GENERAL EXAMINATION: ICD-10-CM

## 2022-10-17 DIAGNOSIS — E87.1 HYPO-OSMOLALITY AND HYPONATREMIA: ICD-10-CM

## 2022-10-17 LAB
25(OH)D3 SERPL-MCNC: 28.3 NG/ML
ALBUMIN SERPL ELPH-MCNC: 4.7 G/DL
ALP BLD-CCNC: 68 U/L
ALT SERPL-CCNC: 20 U/L
ANION GAP SERPL CALC-SCNC: 12 MMOL/L
AST SERPL-CCNC: 22 U/L
BASOPHILS # BLD AUTO: 0.04 K/UL
BASOPHILS NFR BLD AUTO: 0.6 %
BILIRUB SERPL-MCNC: 0.3 MG/DL
BUN SERPL-MCNC: 11 MG/DL
CALCIUM SERPL-MCNC: 9.5 MG/DL
CHLORIDE SERPL-SCNC: 97 MMOL/L
CHOLEST SERPL-MCNC: 195 MG/DL
CO2 SERPL-SCNC: 22 MMOL/L
CREAT SERPL-MCNC: 0.68 MG/DL
EGFR: 95 ML/MIN/1.73M2
EOSINOPHIL # BLD AUTO: 0.1 K/UL
EOSINOPHIL NFR BLD AUTO: 1.6 %
ESTIMATED AVERAGE GLUCOSE: 120 MG/DL
FERRITIN SERPL-MCNC: 18 NG/ML
GLUCOSE SERPL-MCNC: 96 MG/DL
HBA1C MFR BLD HPLC: 5.8 %
HCT VFR BLD CALC: 34.2 %
HDLC SERPL-MCNC: 95 MG/DL
HGB BLD-MCNC: 11.3 G/DL
IMM GRANULOCYTES NFR BLD AUTO: 0.2 %
IRON SATN MFR SERPL: 22 %
IRON SERPL-MCNC: 86 UG/DL
LDLC SERPL CALC-MCNC: 94 MG/DL
LYMPHOCYTES # BLD AUTO: 1.5 K/UL
LYMPHOCYTES NFR BLD AUTO: 24 %
MAN DIFF?: NORMAL
MCHC RBC-ENTMCNC: 30.5 PG
MCHC RBC-ENTMCNC: 33 GM/DL
MCV RBC AUTO: 92.2 FL
MONOCYTES # BLD AUTO: 0.43 K/UL
MONOCYTES NFR BLD AUTO: 6.9 %
NEUTROPHILS # BLD AUTO: 4.17 K/UL
NEUTROPHILS NFR BLD AUTO: 66.7 %
NONHDLC SERPL-MCNC: 100 MG/DL
PLATELET # BLD AUTO: 284 K/UL
POTASSIUM SERPL-SCNC: 4.3 MMOL/L
PROT SERPL-MCNC: 7.5 G/DL
RBC # BLD: 3.71 M/UL
RBC # FLD: 13 %
SODIUM SERPL-SCNC: 131 MMOL/L
TIBC SERPL-MCNC: 396 UG/DL
TRIGL SERPL-MCNC: 29 MG/DL
UIBC SERPL-MCNC: 310 UG/DL
WBC # FLD AUTO: 6.25 K/UL

## 2022-10-17 PROCEDURE — 77063 BREAST TOMOSYNTHESIS BI: CPT

## 2022-10-17 PROCEDURE — 77063 BREAST TOMOSYNTHESIS BI: CPT | Mod: 26

## 2022-10-17 PROCEDURE — 77067 SCR MAMMO BI INCL CAD: CPT

## 2022-10-17 PROCEDURE — 77067 SCR MAMMO BI INCL CAD: CPT | Mod: 26

## 2022-10-18 ENCOUNTER — TRANSCRIPTION ENCOUNTER (OUTPATIENT)
Age: 67
End: 2022-10-18

## 2022-10-25 ENCOUNTER — APPOINTMENT (OUTPATIENT)
Dept: NEUROSURGERY | Facility: CLINIC | Age: 67
End: 2022-10-25

## 2022-10-25 VITALS
TEMPERATURE: 97.7 F | OXYGEN SATURATION: 100 % | HEART RATE: 79 BPM | BODY MASS INDEX: 25.41 KG/M2 | DIASTOLIC BLOOD PRESSURE: 82 MMHG | HEIGHT: 66.34 IN | SYSTOLIC BLOOD PRESSURE: 134 MMHG | WEIGHT: 160 LBS

## 2022-10-25 DIAGNOSIS — R51.9 HEADACHE, UNSPECIFIED: ICD-10-CM

## 2022-10-25 PROCEDURE — 99203 OFFICE O/P NEW LOW 30 MIN: CPT

## 2022-10-28 NOTE — CONSULT LETTER
[Dear  ___] : Dear  [unfilled], [Courtesy Letter:] : I had the pleasure of seeing your patient, [unfilled], in my office today. [Sincerely,] : Sincerely, [FreeTextEntry2] : Diane Shah MD\par [address]\[place] [address]\par  [FreeTextEntry1] : Leida Hernandez is a 66-year-old female who presents today for evaluation of head pain.  Patient indicates she had sustained a fall May 31, 2021.  She believes she had a vasovagal event when she got out of the shower.  She had fallen mutliple times this same day.  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  She had required stitches to the right nose, right cheek region and mouth.  Patient was evaluated by cardiology at the time and had underwent EEG studies as per neurology note. She had also sustained multiple falling episodes with and without striking her head and with and without loss of consciousness prior to this most recent fall sustained on 5/31/2021. Patient continues to experience a right-sided head bruising and constant tenderness sensation.  She reports Tylenol provides her with benefit.\par \par Patient reports difficulties with screen and light sensitivity.  She reports after the fall she had experienced floaters which she was evaluated by an ophthalmologist.  No findings were noted.  No recent visual changes noted.  She denies fever or chills, memory difficulties, noise sensitivity, nausea or vomiting, numbness or tingling, weakness, walking difficulties, or dizziness.  At this present time she reports a headache to the right temporal region which is intermittent.\par \par Patient is alert and oriented.  No distress noted.  Strength to bilateral upper and lower extremities 5/5.  Negative Romberg's.  Negative pronator drift.  Cranial nerves intact.  EOMI.  Finger-to-nose testing intact.  No dysmetria noted.  Reflexes to upper and lower extremity intact.\par \par I provided patient with a prescription for an MRI of the head.  Patient may have some symptoms consistent with postconcussion syndrome.  We will follow-up over the phone once images available which time we will discuss neck steps in the treatment process.  Patient aware to call with any further questions or concerns or with any new or worsening symptoms. [FreeTextEntry3] : Fady Sanchez MD, PhD, CS, FAANS Attending Neurosurgeon  of Neurosurgery [facility] at [facility] at [address], [place] [address] Office: [phone] Fax: [phone]

## 2022-10-31 ENCOUNTER — APPOINTMENT (OUTPATIENT)
Dept: MRI IMAGING | Facility: CLINIC | Age: 67
End: 2022-10-31

## 2022-10-31 ENCOUNTER — OUTPATIENT (OUTPATIENT)
Dept: OUTPATIENT SERVICES | Facility: HOSPITAL | Age: 67
LOS: 1 days | End: 2022-10-31
Payer: COMMERCIAL

## 2022-10-31 DIAGNOSIS — R51.9 HEADACHE, UNSPECIFIED: ICD-10-CM

## 2022-10-31 PROCEDURE — 70551 MRI BRAIN STEM W/O DYE: CPT

## 2022-10-31 PROCEDURE — 70551 MRI BRAIN STEM W/O DYE: CPT | Mod: 26

## 2022-11-03 ENCOUNTER — NON-APPOINTMENT (OUTPATIENT)
Age: 67
End: 2022-11-03

## 2022-11-07 ENCOUNTER — TRANSCRIPTION ENCOUNTER (OUTPATIENT)
Age: 67
End: 2022-11-07

## 2022-11-14 ENCOUNTER — OUTPATIENT (OUTPATIENT)
Dept: OUTPATIENT SERVICES | Facility: HOSPITAL | Age: 67
LOS: 1 days | End: 2022-11-14
Payer: COMMERCIAL

## 2022-11-14 ENCOUNTER — APPOINTMENT (OUTPATIENT)
Dept: ULTRASOUND IMAGING | Facility: CLINIC | Age: 67
End: 2022-11-14

## 2022-11-14 DIAGNOSIS — E04.1 NONTOXIC SINGLE THYROID NODULE: ICD-10-CM

## 2022-11-14 PROCEDURE — 76536 US EXAM OF HEAD AND NECK: CPT

## 2022-11-14 PROCEDURE — 76536 US EXAM OF HEAD AND NECK: CPT | Mod: 26

## 2022-11-18 ENCOUNTER — TRANSCRIPTION ENCOUNTER (OUTPATIENT)
Age: 67
End: 2022-11-18

## 2022-11-18 DIAGNOSIS — Z86.39 PERSONAL HISTORY OF OTHER ENDOCRINE, NUTRITIONAL AND METABOLIC DISEASE: ICD-10-CM

## 2022-11-20 ENCOUNTER — TRANSCRIPTION ENCOUNTER (OUTPATIENT)
Age: 67
End: 2022-11-20

## 2023-03-16 RX ORDER — BUTALBITAL, ASPIRIN, AND CAFFEINE 325; 50; 40 MG/1; MG/1; MG/1
50-325-40 CAPSULE ORAL
Qty: 40 | Refills: 0 | Status: ACTIVE | COMMUNITY
Start: 1900-01-01 | End: 1900-01-01

## 2023-10-04 ENCOUNTER — RESULT CHARGE (OUTPATIENT)
Age: 68
End: 2023-10-04

## 2023-10-05 DIAGNOSIS — Z12.39 ENCOUNTER FOR OTHER SCREENING FOR MALIGNANT NEOPLASM OF BREAST: ICD-10-CM

## 2023-10-13 ENCOUNTER — MED ADMIN CHARGE (OUTPATIENT)
Age: 68
End: 2023-10-13

## 2023-10-13 ENCOUNTER — NON-APPOINTMENT (OUTPATIENT)
Age: 68
End: 2023-10-13

## 2023-10-13 ENCOUNTER — LABORATORY RESULT (OUTPATIENT)
Age: 68
End: 2023-10-13

## 2023-10-13 ENCOUNTER — APPOINTMENT (OUTPATIENT)
Dept: FAMILY MEDICINE | Facility: CLINIC | Age: 68
End: 2023-10-13
Payer: MEDICARE

## 2023-10-13 VITALS
SYSTOLIC BLOOD PRESSURE: 118 MMHG | OXYGEN SATURATION: 100 % | HEIGHT: 66 IN | WEIGHT: 130 LBS | DIASTOLIC BLOOD PRESSURE: 70 MMHG | TEMPERATURE: 97.2 F | BODY MASS INDEX: 20.89 KG/M2 | HEART RATE: 84 BPM

## 2023-10-13 DIAGNOSIS — D50.8 OTHER IRON DEFICIENCY ANEMIAS: ICD-10-CM

## 2023-10-13 DIAGNOSIS — Z80.8 FAMILY HISTORY OF MALIGNANT NEOPLASM OF OTHER ORGANS OR SYSTEMS: ICD-10-CM

## 2023-10-13 DIAGNOSIS — R73.01 IMPAIRED FASTING GLUCOSE: ICD-10-CM

## 2023-10-13 DIAGNOSIS — Z00.00 ENCOUNTER FOR GENERAL ADULT MEDICAL EXAMINATION W/OUT ABNORMAL FINDINGS: ICD-10-CM

## 2023-10-13 DIAGNOSIS — E78.00 PURE HYPERCHOLESTEROLEMIA, UNSPECIFIED: ICD-10-CM

## 2023-10-13 DIAGNOSIS — E04.2 NONTOXIC MULTINODULAR GOITER: ICD-10-CM

## 2023-10-13 DIAGNOSIS — M81.0 AGE-RELATED OSTEOPOROSIS W/OUT CURRENT PATHOLOGICAL FRACTURE: ICD-10-CM
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
## 2023-10-13 PROCEDURE — 36415 COLL VENOUS BLD VENIPUNCTURE: CPT

## 2023-10-13 PROCEDURE — 90686 IIV4 VACC NO PRSV 0.5 ML IM: CPT

## 2023-10-13 PROCEDURE — G0402 INITIAL PREVENTIVE EXAM: CPT

## 2023-10-13 PROCEDURE — G0403: CPT

## 2023-10-13 PROCEDURE — G0008: CPT

## 2023-10-13 RX ORDER — DOXYCYCLINE HYCLATE 100 MG/1
100 CAPSULE ORAL
Qty: 10 | Refills: 3 | Status: ACTIVE | COMMUNITY
Start: 2021-09-22 | End: 1900-01-01

## 2023-10-13 RX ORDER — MULTIVIT-MIN/FOLIC/VIT K/LYCOP 400-300MCG
25 MCG TABLET ORAL
Refills: 0 | Status: DISCONTINUED | COMMUNITY
End: 2023-10-13

## 2023-10-14 ENCOUNTER — TRANSCRIPTION ENCOUNTER (OUTPATIENT)
Age: 68
End: 2023-10-14

## 2023-10-14 LAB
25(OH)D3 SERPL-MCNC: 19.1 NG/ML
ALBUMIN SERPL ELPH-MCNC: 4.4 G/DL
ALP BLD-CCNC: 82 U/L
ALT SERPL-CCNC: 16 U/L
ANION GAP SERPL CALC-SCNC: 10 MMOL/L
AST SERPL-CCNC: 22 U/L
BASOPHILS # BLD AUTO: 0.06 K/UL
BASOPHILS NFR BLD AUTO: 1.2 %
BILIRUB SERPL-MCNC: 0.2 MG/DL
BUN SERPL-MCNC: 8 MG/DL
CALCIUM SERPL-MCNC: 9.1 MG/DL
CHLORIDE SERPL-SCNC: 97 MMOL/L
CHOLEST SERPL-MCNC: 193 MG/DL
CO2 SERPL-SCNC: 25 MMOL/L
CREAT SERPL-MCNC: 0.59 MG/DL
EGFR: 98 ML/MIN/1.73M2
EOSINOPHIL # BLD AUTO: 0.25 K/UL
EOSINOPHIL NFR BLD AUTO: 4.9 %
ESTIMATED AVERAGE GLUCOSE: 117 MG/DL
FERRITIN SERPL-MCNC: 15 NG/ML
FOLATE SERPL-MCNC: 17.4 NG/ML
GLUCOSE SERPL-MCNC: 91 MG/DL
HBA1C MFR BLD HPLC: 5.7 %
HCT VFR BLD CALC: 35.7 %
HDLC SERPL-MCNC: 84 MG/DL
HGB BLD-MCNC: 11.5 G/DL
IMM GRANULOCYTES NFR BLD AUTO: 0 %
IRON SATN MFR SERPL: 16 %
IRON SERPL-MCNC: 62 UG/DL
LDLC SERPL CALC-MCNC: 101 MG/DL
LYMPHOCYTES # BLD AUTO: 1.55 K/UL
LYMPHOCYTES NFR BLD AUTO: 30.6 %
MAN DIFF?: NORMAL
MCHC RBC-ENTMCNC: 29.3 PG
MCHC RBC-ENTMCNC: 32.2 GM/DL
MCV RBC AUTO: 91.1 FL
MONOCYTES # BLD AUTO: 0.58 K/UL
MONOCYTES NFR BLD AUTO: 11.5 %
NEUTROPHILS # BLD AUTO: 2.62 K/UL
NEUTROPHILS NFR BLD AUTO: 51.8 %
NONHDLC SERPL-MCNC: 108 MG/DL
PLATELET # BLD AUTO: 310 K/UL
POTASSIUM SERPL-SCNC: 4.2 MMOL/L
PROT SERPL-MCNC: 7.6 G/DL
RBC # BLD: 3.92 M/UL
RBC # FLD: 13.7 %
SODIUM SERPL-SCNC: 132 MMOL/L
TIBC SERPL-MCNC: 394 UG/DL
TRIGL SERPL-MCNC: 37 MG/DL
TSH SERPL-ACNC: 0.93 UIU/ML
UIBC SERPL-MCNC: 333 UG/DL
VIT B12 SERPL-MCNC: 414 PG/ML
WBC # FLD AUTO: 5.06 K/UL

## 2023-10-16 ENCOUNTER — TRANSCRIPTION ENCOUNTER (OUTPATIENT)
Age: 68
End: 2023-10-16

## 2023-10-19 ENCOUNTER — TRANSCRIPTION ENCOUNTER (OUTPATIENT)
Age: 68
End: 2023-10-19

## 2023-10-19 ENCOUNTER — RESULT REVIEW (OUTPATIENT)
Age: 68
End: 2023-10-19

## 2023-10-19 ENCOUNTER — OUTPATIENT (OUTPATIENT)
Dept: OUTPATIENT SERVICES | Facility: HOSPITAL | Age: 68
LOS: 1 days | End: 2023-10-19
Payer: MEDICARE

## 2023-10-19 ENCOUNTER — APPOINTMENT (OUTPATIENT)
Dept: MAMMOGRAPHY | Facility: CLINIC | Age: 68
End: 2023-10-19
Payer: MEDICARE

## 2023-10-19 DIAGNOSIS — Z12.39 ENCOUNTER FOR OTHER SCREENING FOR MALIGNANT NEOPLASM OF BREAST: ICD-10-CM

## 2023-10-19 PROCEDURE — 77067 SCR MAMMO BI INCL CAD: CPT | Mod: 26

## 2023-10-19 PROCEDURE — 77063 BREAST TOMOSYNTHESIS BI: CPT | Mod: 26

## 2023-10-19 PROCEDURE — 77063 BREAST TOMOSYNTHESIS BI: CPT

## 2023-10-19 PROCEDURE — 77067 SCR MAMMO BI INCL CAD: CPT

## 2024-10-21 ENCOUNTER — RESULT REVIEW (OUTPATIENT)
Age: 69
End: 2024-10-21

## 2024-10-21 ENCOUNTER — APPOINTMENT (OUTPATIENT)
Dept: MAMMOGRAPHY | Facility: CLINIC | Age: 69
End: 2024-10-21
Payer: MEDICARE

## 2024-10-21 ENCOUNTER — OUTPATIENT (OUTPATIENT)
Dept: OUTPATIENT SERVICES | Facility: HOSPITAL | Age: 69
LOS: 1 days | End: 2024-10-21
Payer: MEDICARE

## 2024-10-21 ENCOUNTER — TRANSCRIPTION ENCOUNTER (OUTPATIENT)
Age: 69
End: 2024-10-21

## 2024-10-21 DIAGNOSIS — Z12.39 ENCOUNTER FOR OTHER SCREENING FOR MALIGNANT NEOPLASM OF BREAST: ICD-10-CM

## 2024-10-21 PROCEDURE — 77067 SCR MAMMO BI INCL CAD: CPT | Mod: 26

## 2024-10-21 PROCEDURE — 77063 BREAST TOMOSYNTHESIS BI: CPT | Mod: 26

## 2024-10-29 ENCOUNTER — RESULT CHARGE (OUTPATIENT)
Age: 69
End: 2024-10-29

## 2024-11-01 ENCOUNTER — NON-APPOINTMENT (OUTPATIENT)
Age: 69
End: 2024-11-01

## 2024-11-01 ENCOUNTER — APPOINTMENT (OUTPATIENT)
Dept: FAMILY MEDICINE | Facility: CLINIC | Age: 69
End: 2024-11-01
Payer: MEDICARE

## 2024-11-01 ENCOUNTER — LABORATORY RESULT (OUTPATIENT)
Age: 69
End: 2024-11-01

## 2024-11-01 VITALS
DIASTOLIC BLOOD PRESSURE: 68 MMHG | HEIGHT: 66 IN | WEIGHT: 131 LBS | OXYGEN SATURATION: 99 % | TEMPERATURE: 97.2 F | SYSTOLIC BLOOD PRESSURE: 122 MMHG | HEART RATE: 74 BPM | BODY MASS INDEX: 21.05 KG/M2

## 2024-11-01 DIAGNOSIS — E04.2 NONTOXIC MULTINODULAR GOITER: ICD-10-CM

## 2024-11-01 DIAGNOSIS — R73.01 IMPAIRED FASTING GLUCOSE: ICD-10-CM

## 2024-11-01 DIAGNOSIS — G43.909 MIGRAINE, UNSPECIFIED, NOT INTRACTABLE, W/OUT STATUS MIGRAINOSUS: ICD-10-CM
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
## 2024-11-01 DIAGNOSIS — D50.8 OTHER IRON DEFICIENCY ANEMIAS: ICD-10-CM

## 2024-11-01 DIAGNOSIS — R00.1 BRADYCARDIA, UNSPECIFIED: ICD-10-CM

## 2024-11-01 DIAGNOSIS — Z00.00 ENCOUNTER FOR GENERAL ADULT MEDICAL EXAMINATION W/OUT ABNORMAL FINDINGS: ICD-10-CM

## 2024-11-01 DIAGNOSIS — M81.0 AGE-RELATED OSTEOPOROSIS W/OUT CURRENT PATHOLOGICAL FRACTURE: ICD-10-CM

## 2024-11-01 DIAGNOSIS — E78.00 PURE HYPERCHOLESTEROLEMIA, UNSPECIFIED: ICD-10-CM

## 2024-11-01 PROCEDURE — G0403: CPT

## 2024-11-01 PROCEDURE — 36415 COLL VENOUS BLD VENIPUNCTURE: CPT

## 2024-11-01 PROCEDURE — G0438: CPT

## 2024-11-01 PROCEDURE — 93000 ELECTROCARDIOGRAM COMPLETE: CPT

## 2024-11-01 PROCEDURE — G0402 INITIAL PREVENTIVE EXAM: CPT

## 2024-11-01 RX ORDER — CHROMIUM 200 MCG
TABLET ORAL
Refills: 0 | Status: ACTIVE | COMMUNITY

## 2024-11-03 LAB
25(OH)D3 SERPL-MCNC: 30.4 NG/ML
ALBUMIN SERPL ELPH-MCNC: 4.5 G/DL
ALP BLD-CCNC: 77 U/L
ALT SERPL-CCNC: 17 U/L
ANION GAP SERPL CALC-SCNC: 12 MMOL/L
AST SERPL-CCNC: 20 U/L
BASOPHILS # BLD AUTO: 0.07 K/UL
BASOPHILS NFR BLD AUTO: 1.2 %
BILIRUB SERPL-MCNC: 0.4 MG/DL
BUN SERPL-MCNC: 11 MG/DL
CALCIUM SERPL-MCNC: 9.2 MG/DL
CHLORIDE SERPL-SCNC: 96 MMOL/L
CHOLEST SERPL-MCNC: 215 MG/DL
CO2 SERPL-SCNC: 24 MMOL/L
CREAT SERPL-MCNC: 0.66 MG/DL
EGFR: 95 ML/MIN/1.73M2
EOSINOPHIL # BLD AUTO: 0.29 K/UL
EOSINOPHIL NFR BLD AUTO: 5 %
FERRITIN SERPL-MCNC: 20 NG/ML
GLUCOSE SERPL-MCNC: 90 MG/DL
HCT VFR BLD CALC: 34.5 %
HDLC SERPL-MCNC: 88 MG/DL
HGB BLD-MCNC: 11.5 G/DL
IMM GRANULOCYTES NFR BLD AUTO: 0.2 %
IRON SATN MFR SERPL: 26 %
IRON SERPL-MCNC: 103 UG/DL
LDLC SERPL CALC-MCNC: 118 MG/DL
LYMPHOCYTES # BLD AUTO: 1.53 K/UL
LYMPHOCYTES NFR BLD AUTO: 26.6 %
MAN DIFF?: NORMAL
MCHC RBC-ENTMCNC: 30.9 PG
MCHC RBC-ENTMCNC: 33.3 G/DL
MCV RBC AUTO: 92.7 FL
MONOCYTES # BLD AUTO: 0.48 K/UL
MONOCYTES NFR BLD AUTO: 8.3 %
NEUTROPHILS # BLD AUTO: 3.37 K/UL
NEUTROPHILS NFR BLD AUTO: 58.7 %
NONHDLC SERPL-MCNC: 126 MG/DL
PLATELET # BLD AUTO: 303 K/UL
POTASSIUM SERPL-SCNC: 4.5 MMOL/L
PROT SERPL-MCNC: 7.7 G/DL
RBC # BLD: 3.72 M/UL
RBC # FLD: 13.2 %
SODIUM SERPL-SCNC: 132 MMOL/L
TIBC SERPL-MCNC: 398 UG/DL
TRIGL SERPL-MCNC: 45 MG/DL
TSH SERPL-ACNC: 1.19 UIU/ML
UIBC SERPL-MCNC: 295 UG/DL
VIT B12 SERPL-MCNC: 473 PG/ML
WBC # FLD AUTO: 5.75 K/UL

## 2024-11-04 ENCOUNTER — TRANSCRIPTION ENCOUNTER (OUTPATIENT)
Age: 69
End: 2024-11-04

## 2024-11-20 PROCEDURE — 77067 SCR MAMMO BI INCL CAD: CPT

## 2024-11-20 PROCEDURE — 77063 BREAST TOMOSYNTHESIS BI: CPT
